# Patient Record
Sex: MALE | Race: WHITE | NOT HISPANIC OR LATINO | URBAN - METROPOLITAN AREA
[De-identification: names, ages, dates, MRNs, and addresses within clinical notes are randomized per-mention and may not be internally consistent; named-entity substitution may affect disease eponyms.]

---

## 2018-01-12 DIAGNOSIS — E78.5 HYPERLIPIDEMIA, UNSPECIFIED HYPERLIPIDEMIA TYPE: ICD-10-CM

## 2018-01-12 DIAGNOSIS — I10 ESSENTIAL HYPERTENSION: Primary | ICD-10-CM

## 2018-01-15 DIAGNOSIS — R73.09 ELEVATED GLUCOSE LEVEL: Primary | ICD-10-CM

## 2018-01-16 LAB
ALBUMIN SERPL-MCNC: 4.2 G/DL (ref 3.5–5.2)
ALBUMIN/GLOB SERPL: 1.4 G/DL
ALP SERPL-CCNC: 77 U/L (ref 39–117)
ALT SERPL-CCNC: 89 U/L (ref 1–41)
AST SERPL-CCNC: 67 U/L (ref 1–40)
BILIRUB SERPL-MCNC: 0.6 MG/DL (ref 0.1–1.2)
BUN SERPL-MCNC: 14 MG/DL (ref 6–20)
BUN/CREAT SERPL: 13 (ref 7–25)
CALCIUM SERPL-MCNC: 10 MG/DL (ref 8.6–10.5)
CHLORIDE SERPL-SCNC: 103 MMOL/L (ref 98–107)
CHOLEST SERPL-MCNC: 96 MG/DL (ref 0–200)
CO2 SERPL-SCNC: 26.7 MMOL/L (ref 22–29)
CREAT SERPL-MCNC: 1.08 MG/DL (ref 0.76–1.27)
GLOBULIN SER CALC-MCNC: 3 GM/DL
GLUCOSE SERPL-MCNC: 118 MG/DL (ref 65–99)
HBA1C MFR BLD: 6.71 % (ref 4.8–5.6)
HDLC SERPL-MCNC: 23 MG/DL (ref 40–60)
LDLC SERPL CALC-MCNC: 42 MG/DL (ref 0–100)
LDLC/HDLC SERPL: 1.82 {RATIO}
POTASSIUM SERPL-SCNC: 4.3 MMOL/L (ref 3.5–5.2)
PROT SERPL-MCNC: 7.2 G/DL (ref 6–8.5)
SODIUM SERPL-SCNC: 144 MMOL/L (ref 136–145)
TRIGL SERPL-MCNC: 156 MG/DL (ref 0–150)
VLDLC SERPL CALC-MCNC: 31.2 MG/DL (ref 5–40)

## 2018-01-19 ENCOUNTER — OFFICE VISIT (OUTPATIENT)
Dept: FAMILY MEDICINE CLINIC | Facility: CLINIC | Age: 48
End: 2018-01-19

## 2018-01-19 VITALS
HEART RATE: 85 BPM | WEIGHT: 315 LBS | SYSTOLIC BLOOD PRESSURE: 118 MMHG | BODY MASS INDEX: 44.1 KG/M2 | HEIGHT: 71 IN | DIASTOLIC BLOOD PRESSURE: 74 MMHG | TEMPERATURE: 97.9 F | OXYGEN SATURATION: 92 %

## 2018-01-19 DIAGNOSIS — R73.01 IMPAIRED FASTING GLUCOSE: ICD-10-CM

## 2018-01-19 DIAGNOSIS — K21.9 GASTROESOPHAGEAL REFLUX DISEASE, ESOPHAGITIS PRESENCE NOT SPECIFIED: ICD-10-CM

## 2018-01-19 DIAGNOSIS — L98.9 SKIN LESION: ICD-10-CM

## 2018-01-19 DIAGNOSIS — E66.01 MORBID OBESITY DUE TO EXCESS CALORIES (HCC): ICD-10-CM

## 2018-01-19 DIAGNOSIS — I10 ESSENTIAL HYPERTENSION: Primary | ICD-10-CM

## 2018-01-19 DIAGNOSIS — E78.2 MIXED HYPERLIPIDEMIA: ICD-10-CM

## 2018-01-19 PROCEDURE — 99214 OFFICE O/P EST MOD 30 MIN: CPT | Performed by: FAMILY MEDICINE

## 2018-01-19 RX ORDER — SIMVASTATIN 40 MG
40 TABLET ORAL NIGHTLY
Qty: 90 TABLET | Refills: 1 | Status: SHIPPED | OUTPATIENT
Start: 2018-01-19 | End: 2018-09-04 | Stop reason: SDUPTHER

## 2018-01-19 RX ORDER — RISPERIDONE 4 MG/1
4 TABLET ORAL
Refills: 3 | COMMUNITY
Start: 2017-11-16 | End: 2020-10-27 | Stop reason: SDUPTHER

## 2018-01-19 RX ORDER — VALSARTAN AND HYDROCHLOROTHIAZIDE 160; 25 MG/1; MG/1
TABLET ORAL
COMMUNITY
Start: 2017-05-09 | End: 2018-12-20

## 2018-01-19 RX ORDER — ESOMEPRAZOLE MAGNESIUM 40 MG/1
40 CAPSULE, DELAYED RELEASE ORAL
Qty: 30 CAPSULE | Refills: 5 | Status: SHIPPED | OUTPATIENT
Start: 2018-01-19 | End: 2018-10-06 | Stop reason: SDUPTHER

## 2018-01-19 RX ORDER — BISOPROLOL FUMARATE AND HYDROCHLOROTHIAZIDE 10; 6.25 MG/1; MG/1
1 TABLET ORAL DAILY
Qty: 90 TABLET | Refills: 1 | Status: SHIPPED | OUTPATIENT
Start: 2018-01-19 | End: 2018-09-04 | Stop reason: SDUPTHER

## 2018-01-19 RX ORDER — ESOMEPRAZOLE MAGNESIUM 40 MG/1
CAPSULE, DELAYED RELEASE ORAL
COMMUNITY
Start: 2018-01-09 | End: 2018-01-19 | Stop reason: SDUPTHER

## 2018-01-19 NOTE — PROGRESS NOTES
"Subjective   Kade Pereira is a 47 y.o. male.     Annual Exam (follow up labs, needs a dermatology referral )    History of Present Illness    Hypertension follow up. Doing well with current medication which he is taking as directed. No known high or low blood pressure episodes. No cardiovascular or neurological symptoms. Today's BP: 118/74.  I have not seen the patient a couple of years because of insurance issues.    Hyperlipidemia follow up. He is taking statin medication without complaint.  He is also taking fenofibrate.  His liver enzymes are moderately elevated.  About 2 or 3 times normal.  Morbid obesity.  No alcohol use.  Nonsmoker.  He has no exercise.  Also taking fenofibrate.  No myopathy symptoms.     Last lipid panel:   Lab Results   Component Value Date    HDL 23 (L) 01/15/2018     Lab Results   Component Value Date    LDL 42 01/15/2018     Lab Results   Component Value Date    TRIG 156 (H) 01/15/2018     Impaired fasting glucose.  A1c 6.2 at the [facility] system one year ago.  Now 6.7.  However his fasting blood glucose recently was 118.  However at the [facility] system year ago was about 130.    The following portions of the patient's history were reviewed and updated as appropriate: allergies, current medications, past family history, past medical history, past social history, past surgical history and problem list.      Review of Systems   Constitutional: Negative.    Respiratory: Negative.    Cardiovascular: Negative.    Musculoskeletal: Positive for back pain.   Neurological: Negative.    Psychiatric/Behavioral: Negative.        Objective   Blood pressure 118/74, pulse 85, temperature 97.9 °F (36.6 °C), temperature source Oral, height 180.3 cm (70.98\"), weight (!) 175 kg (385 lb 8 oz), SpO2 92 %.  Physical Exam   Constitutional: He appears well-developed and well-nourished. No distress.   Neck: No thyromegaly present.   Cardiovascular: Normal rate, regular rhythm, normal heart sounds and intact " distal pulses.    Pulmonary/Chest: Effort normal and breath sounds normal.   Abdominal: Soft. Bowel sounds are normal. He exhibits no distension. There is no tenderness.   Morbidly obese   Musculoskeletal: He exhibits no edema.   Skin: Skin is warm and dry.   Psychiatric: He has a normal mood and affect. His behavior is normal. Judgment and thought content normal.   Nursing note and vitals reviewed.      Assessment/Plan   Kade was seen today for annual exam.    Diagnoses and all orders for this visit:    Essential hypertension    Mixed hyperlipidemia  -     Comprehensive Metabolic Panel; Future  -     Lipid Panel; Future    Impaired fasting glucose  -     Hemoglobin A1c; Future    Morbid obesity due to excess calories    Gastroesophageal reflux disease, esophagitis presence not specified    Skin lesion  -     Ambulatory Referral to Dermatology    Other orders  -     esomeprazole (nexIUM) 40 MG capsule; Take 1 capsule by mouth Every Morning Before Breakfast.  -     bisoprolol-hydrochlorothiazide (ZIAC) 10-6.25 MG per tablet; Take 1 tablet by mouth Daily. TK 1 T PO QD  -     simvastatin (ZOCOR) 40 MG tablet; Take 1 tablet by mouth Every Night. TK 1 T PO QHS  -     metFORMIN (GLUCOPHAGE) 500 MG tablet; Take 1 tablet by mouth Daily With Breakfast.      Impaired fasting glucose.  Probable developing diabetes.  I am starting metformin 500 mg once a day side effects discussed including GI upset.  I'll see her back within 3 months for recheck and wellness visit.  We discussed portion size.  We discussed low carbohydrates.  We discussed increase exercise in detail.    Hyperlipidemia.  He continues on simvastatin and fenofibrate.  His liver enzymes are minimally elevated likely related to nonalcoholic fatty liver syndrome.  At this point I am not stopping the cholesterol medication.    Hypertension.  Stable.  I refilled his bisoprolol hydrochlorothiazide.  Next    GERD.  Long-standing.  Continues Nexium.    Reported  schizoaffective disorder.  Also followed by psychiatry.

## 2018-01-24 ENCOUNTER — RESULTS ENCOUNTER (OUTPATIENT)
Dept: FAMILY MEDICINE CLINIC | Facility: CLINIC | Age: 48
End: 2018-01-24

## 2018-01-24 DIAGNOSIS — E78.2 MIXED HYPERLIPIDEMIA: ICD-10-CM

## 2018-01-24 DIAGNOSIS — R73.01 IMPAIRED FASTING GLUCOSE: ICD-10-CM

## 2018-09-04 RX ORDER — BISOPROLOL FUMARATE AND HYDROCHLOROTHIAZIDE 10; 6.25 MG/1; MG/1
TABLET ORAL
Qty: 90 TABLET | Refills: 1 | Status: SHIPPED | OUTPATIENT
Start: 2018-09-04 | End: 2019-03-31 | Stop reason: SDUPTHER

## 2018-09-04 RX ORDER — SIMVASTATIN 40 MG
TABLET ORAL
Qty: 90 TABLET | Refills: 1 | Status: SHIPPED | OUTPATIENT
Start: 2018-09-04 | End: 2019-03-30 | Stop reason: SDUPTHER

## 2018-10-08 RX ORDER — ESOMEPRAZOLE MAGNESIUM 40 MG/1
40 CAPSULE, DELAYED RELEASE ORAL
Qty: 30 CAPSULE | Refills: 5 | Status: SHIPPED | OUTPATIENT
Start: 2018-10-08 | End: 2019-03-31 | Stop reason: SDUPTHER

## 2018-12-20 DIAGNOSIS — E78.2 MIXED HYPERLIPIDEMIA: Primary | ICD-10-CM

## 2018-12-20 DIAGNOSIS — R73.01 IMPAIRED FASTING GLUCOSE: ICD-10-CM

## 2018-12-20 DIAGNOSIS — I10 ESSENTIAL HYPERTENSION: ICD-10-CM

## 2018-12-20 RX ORDER — LOSARTAN POTASSIUM AND HYDROCHLOROTHIAZIDE 25; 100 MG/1; MG/1
1 TABLET ORAL DAILY
Qty: 90 TABLET | Refills: 1 | OUTPATIENT
Start: 2018-12-20

## 2018-12-20 RX ORDER — LOSARTAN POTASSIUM AND HYDROCHLOROTHIAZIDE 25; 100 MG/1; MG/1
1 TABLET ORAL DAILY
Qty: 30 TABLET | Refills: 1 | Status: SHIPPED | OUTPATIENT
Start: 2018-12-20 | End: 2019-04-16 | Stop reason: SDUPTHER

## 2018-12-20 NOTE — TELEPHONE ENCOUNTER
He is overdue for a doctor visit.  I'm only giving him a 30 day supply.  He needs a CMP, lipid panel, A1c ordered

## 2019-04-01 RX ORDER — ESOMEPRAZOLE MAGNESIUM 40 MG/1
40 CAPSULE, DELAYED RELEASE ORAL
Qty: 30 CAPSULE | Refills: 4 | Status: SHIPPED | OUTPATIENT
Start: 2019-04-01 | End: 2019-08-18 | Stop reason: SDUPTHER

## 2019-04-01 RX ORDER — BISOPROLOL FUMARATE AND HYDROCHLOROTHIAZIDE 10; 6.25 MG/1; MG/1
TABLET ORAL
Qty: 90 TABLET | Refills: 0 | Status: SHIPPED | OUTPATIENT
Start: 2019-04-01 | End: 2019-05-24 | Stop reason: SDUPTHER

## 2019-04-01 RX ORDER — SIMVASTATIN 40 MG
TABLET ORAL
Qty: 90 TABLET | Refills: 0 | Status: SHIPPED | OUTPATIENT
Start: 2019-04-01 | End: 2019-05-24 | Stop reason: SDUPTHER

## 2019-04-15 NOTE — TELEPHONE ENCOUNTER
Pt called wanting refills for bp meds he has cancelled appointments several times so I transferred him up front to make an apt for this week or next week

## 2019-04-16 ENCOUNTER — OFFICE VISIT (OUTPATIENT)
Dept: FAMILY MEDICINE CLINIC | Facility: CLINIC | Age: 49
End: 2019-04-16

## 2019-04-16 VITALS
DIASTOLIC BLOOD PRESSURE: 82 MMHG | BODY MASS INDEX: 44.1 KG/M2 | SYSTOLIC BLOOD PRESSURE: 139 MMHG | HEART RATE: 82 BPM | TEMPERATURE: 96.7 F | HEIGHT: 71 IN | OXYGEN SATURATION: 93 % | WEIGHT: 315 LBS

## 2019-04-16 DIAGNOSIS — C61 PROSTATE CANCER, RECUR RISK NOT DETERMINED WHETHER LOW, MED OR HIGH (HCC): ICD-10-CM

## 2019-04-16 DIAGNOSIS — E66.01 MORBID OBESITY DUE TO EXCESS CALORIES (HCC): ICD-10-CM

## 2019-04-16 DIAGNOSIS — F25.9 SCHIZOAFFECTIVE DISORDER, UNSPECIFIED TYPE (HCC): ICD-10-CM

## 2019-04-16 DIAGNOSIS — I10 ESSENTIAL HYPERTENSION: ICD-10-CM

## 2019-04-16 DIAGNOSIS — Z12.5 SCREENING FOR PROSTATE CANCER: ICD-10-CM

## 2019-04-16 DIAGNOSIS — Z12.5 SCREENING PSA (PROSTATE SPECIFIC ANTIGEN): ICD-10-CM

## 2019-04-16 DIAGNOSIS — E78.2 MIXED HYPERLIPIDEMIA: ICD-10-CM

## 2019-04-16 DIAGNOSIS — R73.01 IMPAIRED FASTING GLUCOSE: ICD-10-CM

## 2019-04-16 DIAGNOSIS — Z00.00 MEDICARE ANNUAL WELLNESS VISIT, INITIAL: Primary | ICD-10-CM

## 2019-04-16 DIAGNOSIS — S06.9X9S TRAUMATIC BRAIN INJURY WITH LOSS OF CONSCIOUSNESS, SEQUELA (HCC): ICD-10-CM

## 2019-04-16 PROCEDURE — 99214 OFFICE O/P EST MOD 30 MIN: CPT | Performed by: FAMILY MEDICINE

## 2019-04-16 PROCEDURE — 96160 PT-FOCUSED HLTH RISK ASSMT: CPT | Performed by: FAMILY MEDICINE

## 2019-04-16 PROCEDURE — G0438 PPPS, INITIAL VISIT: HCPCS | Performed by: FAMILY MEDICINE

## 2019-04-16 RX ORDER — LOSARTAN POTASSIUM AND HYDROCHLOROTHIAZIDE 25; 100 MG/1; MG/1
1 TABLET ORAL DAILY
Qty: 90 TABLET | Refills: 1 | Status: SHIPPED | OUTPATIENT
Start: 2019-04-16 | End: 2019-05-24 | Stop reason: SDUPTHER

## 2019-04-16 RX ORDER — FLUOXETINE HYDROCHLORIDE 20 MG/1
CAPSULE ORAL
Refills: 1 | COMMUNITY
Start: 2019-04-10

## 2019-04-16 NOTE — PROGRESS NOTES
Initial Medicare Wellness Visit   The ABC's of the Annual Wellness Visit    Chief Complaint   Patient presents with   • Hypertension   • Diarrhea     due to the metformin would like something else   • Skin Problem     there is a spot on his belly that feels different   • Leg Swelling     bilateral his legs will sometimes leak fluid       HPI:  Kade Pereira, -1970, is a 48 y.o. male who presents for an Initial Medicare Wellness Visit.    Recent Hospitalizations:  No hospitalization(s) within the last year..    Current Medical Providers:  Patient Care Team:  Basil Covington MD as PCP - General (Family Medicine)    Health Habits and Functional and Cognitive Screening and Depression Screening:  Functional & Cognitive Status 2019   Do you have difficulty preparing food and eating? No   Do you have difficulty bathing yourself, getting dressed or grooming yourself? Yes   Do you have difficulty using the toilet? Yes   Do you have difficulty moving around from place to place? No   Do you have trouble with steps or getting out of a bed or a chair? No   In the past year have you fallen or experienced a near fall? Yes   Current Diet Unhealthy Diet   Dental Exam Not up to date   Eye Exam Not up to date   Exercise (times per week) 0 times per week   Current Exercise Activities Include None   Do you need help using the phone?  No   Are you deaf or do you have serious difficulty hearing?  No   Do you need help with transportation? No   Do you need help shopping? Yes   Do you need help preparing meals?  No   Do you need help with housework?  Yes   Do you need help with laundry? Yes   Do you need help taking your medications? No   Do you need help managing money? No   Do you ever drive or ride in a car without wearing a seat belt? Yes   Have you felt unusual stress, anger or loneliness in the last month? Yes   Who do you live with? Spouse   If you need help, do you have trouble finding someone available to you? No    Have you been bothered in the last four weeks by sexual problems? Yes   Do you have difficulty concentrating, remembering or making decisions? Yes       Compared to one year ago, the patient feels his physical health is the same and his mental health is the same.    Depression Screen:  PHQ-2/PHQ-9 Depression Screening 4/16/2019   Little interest or pleasure in doing things 3   Feeling down, depressed, or hopeless 1   Trouble falling or staying asleep, or sleeping too much 3   Feeling tired or having little energy 3   Poor appetite or overeating 0   Feeling bad about yourself - or that you are a failure or have let yourself or your family down 1   Trouble concentrating on things, such as reading the newspaper or watching television 3   Moving or speaking so slowly that other people could have noticed. Or the opposite - being so fidgety or restless that you have been moving around a lot more than usual 0   Thoughts that you would be better off dead, or of hurting yourself in some way 0   Total Score 14   If you checked off any problems, how difficult have these problems made it for you to do your work, take care of things at home, or get along with other people? Very difficult         Past Medical/Family/Social History:  The following portions of the patient's history were reviewed and updated as appropriate: allergies, current medications, past family history, past medical history, past social history, past surgical history and problem list.    Allergies   Allergen Reactions   • Ace Inhibitors    • Lorabid [Loracarbef]      rash         Current Outpatient Medications:   •  bisoprolol-hydrochlorothiazide (ZIAC) 10-6.25 MG per tablet, TAKE 1 TABLET BY MOUTH EVERY DAY, Disp: 90 tablet, Rfl: 0  •  esomeprazole (nexIUM) 40 MG capsule, TAKE 1 CAPSULE BY MOUTH EVERY MORNING BEFORE BREAKFAST., Disp: 30 capsule, Rfl: 4  •  fenofibrate 160 MG tablet, TAKE 1 TABLET BY MOUTH EVERY DAY, Disp: , Rfl: 0  •  FLUoxetine (PROzac) 20  "MG capsule, TK ONE C PO  QAM, Disp: , Rfl: 1  •  losartan-hydrochlorothiazide (HYZAAR) 100-25 MG per tablet, Take 1 tablet by mouth Daily., Disp: 90 tablet, Rfl: 1  •  risperiDONE (risperDAL) 4 MG tablet, 4 mg. Take 1/2 by mouth every morning and 1 1/2 tablet every evening, Disp: , Rfl: 3  •  simvastatin (ZOCOR) 40 MG tablet, TAKE 1 TABLET BY MOUTH AT BEDTIME, Disp: 90 tablet, Rfl: 0    Aspirin use counseling: Does not need ASA (and currently is not on it)    Current medication list contains high risk medications. Some potential harmful drug interactions identified. Plan of action: continue psych meds per pschiatry    Family History   Problem Relation Age of Onset   • Thyroid disease Mother    • Hypertension Mother    • Hypertension Father        Social History     Tobacco Use   • Smoking status: Never Smoker   • Smokeless tobacco: Never Used   Substance Use Topics   • Alcohol use: Yes     Comment: occasional       Past Surgical History:   Procedure Laterality Date   • COLONOSCOPY     • HAND SURGERY     • WISDOM TOOTH EXTRACTION         Patient Active Problem List   Diagnosis   • Mixed hyperlipidemia   • Essential hypertension   • Schizoaffective disorder (CMS/HCC)   • Traumatic brain injury (CMS/HCC)   • Morbid obesity due to excess calories (CMS/HCC)   • Venous stasis dermatitis of both lower extremities   • Gastroesophageal reflux disease   • Obstructive sleep apnea   • Impaired fasting glucose       Review of Systems    Objective     Vitals:    04/16/19 1506   BP: 139/82   Pulse: 82   Temp: 96.7 °F (35.9 °C)   TempSrc: Oral   SpO2: 93%   Weight: (!) 183 kg (403 lb)   Height: 180.3 cm (70.98\")       Patient's Body mass index is 56.23 kg/m². BMI is above normal parameters. Recommendations include: exercise counseling.      No exam data present    The patient has no evidence of cognitve impairment.     Physical Exam    Recent Lab Results:  Lab Results   Component Value Date     (H) 01/15/2018     Lab Results "   Component Value Date    TRIG 156 (H) 01/15/2018    HDL 23 (L) 01/15/2018    VLDL 31.2 01/15/2018    LDLHDL 1.82 01/15/2018         Assessment/Plan   Age-appropriate Screening Schedule:  Refer to the list below for future screening recommendations based on patient's age, sex and/or medical conditions.      Health Maintenance   Topic Date Due   • TDAP/TD VACCINES (1 - Tdap) 08/02/1990   • LIPID PANEL  01/15/2019   • INFLUENZA VACCINE  08/01/2019       Medicare Risks and Personalized Health Plan:  Obesity/Overweight condition;  Exercise recommended. And weight watchers.     CMS-Preventive Services Quick Reference  Medicare Preventive Services Addressed:  Annual Wellness Visit (AWV)   Recommend TdaP at pharmacy.   Prostate cancer screening discussed.    Advance Care Planning:  Patient does not have an advance directive - information provided to the patient today    Diagnoses and all orders for this visit:    1. Medicare annual wellness visit, initial (Primary)    2. Impaired fasting glucose  -     Hemoglobin A1c    3. Essential hypertension    4. Mixed hyperlipidemia  -     Comprehensive Metabolic Panel  -     Lipid Panel    5. Schizoaffective disorder, unspecified type (CMS/[facility])    6. Traumatic brain injury with loss of consciousness, sequela (CMS/[facility])    7. Morbid obesity due to excess calories (CMS/[facility])    8. Screening PSA (prostate specific antigen)    9. Screening for prostate cancer    10. Prostate cancer, recur risk not determined whether low, med or high (CMS/[facility])  -     PSA Screen    Other orders  -     losartan-hydrochlorothiazide (HYZAAR) 100-25 MG per tablet; Take 1 tablet by mouth Daily.  Dispense: 90 tablet; Refill: 1        An After Visit Summary and PPPS with all of these plans were given to the patient.      Follow Up:  Return in about 3 months (around 7/16/2019) for Subsequent Medicare Wellness Visit.

## 2019-04-16 NOTE — PROGRESS NOTES
"Subjective   Kade Pereira is a 48 y.o. male.     Chief Complaint   Patient presents with   • Hypertension   • Diarrhea     due to the metformin would like something else   • Skin Problem     there is a spot on his belly that feels different   • Leg Swelling     bilateral his legs will sometimes leak fluid        History of Present Illness    Impaired fasting blood work.  His A1c was in the mid 6% range.  We started metformin last visit.  He stopped it because of diarrhea.  He has borderline criteria for diabetes type 2.  Severe morbid obesity.  He has trouble exercising because of his weight.    Hypertension.  He continues losartan hydrochlorothiazide at maximum dose.  He has some dependent edema that bothers him at times.  Exacerbated by his morbid obesity.    Traumatic brain injury some years ago.  He is on disability.  Also schizoaffective disorder.  He has some anxiety in the evening with some paranoia.  He is followed by a psychiatrist.  He continues Risperdal and and fluoxetine.    Mixed hyperlipidemia.  He continues his fenofibrate and simvastatin.  He has not gotten blood work done the last 2 times I have ordered it.      The following portions of the patient's history were reviewed and updated as appropriate: allergies, current medications, past family history, past medical history, past social history, past surgical history and problem list.          Review of Systems   Constitutional: Negative.    Respiratory: Negative.    Cardiovascular: Positive for leg swelling.   Musculoskeletal: Negative.    Psychiatric/Behavioral: Positive for dysphoric mood.       Objective   Blood pressure 139/82, pulse 82, temperature 96.7 °F (35.9 °C), temperature source Oral, height 180.3 cm (70.98\"), weight (!) 183 kg (403 lb), SpO2 93 %.  Physical Exam   Constitutional: He appears well-developed and well-nourished. No distress.   Neck: No thyromegaly present.   Cardiovascular: Normal rate, regular rhythm, normal heart " sounds and intact distal pulses.   Pulmonary/Chest: Effort normal and breath sounds normal.   Abdominal: Soft.   Obese panniculus.  He describes some fullness sensation in the left lower abdomen.  He thinks he has a fat roll that is different in his words.  On palpation unremarkable.  No lipoma.  Nontender.  No mass.   Musculoskeletal: He exhibits edema.   Patient has moderately dense lower extremity edema with chronic venous stasis.  No erythema.   Skin: Skin is warm and dry.   Psychiatric: Judgment and thought content normal.   Nursing note and vitals reviewed.      Assessment/Plan   Kade was seen today for hypertension, diarrhea, skin problem and leg swelling.    Diagnoses and all orders for this visit:    Medicare annual wellness visit, initial    Impaired fasting glucose  -     Hemoglobin A1c    Essential hypertension    Mixed hyperlipidemia  -     Comprehensive Metabolic Panel  -     Lipid Panel    Schizoaffective disorder, unspecified type (CMS/HCC)    Traumatic brain injury with loss of consciousness, sequela (CMS/HCC)    Morbid obesity due to excess calories (CMS/HCC)    Screening PSA (prostate specific antigen)  -     PSA Screen    Other orders  -     losartan-hydrochlorothiazide (HYZAAR) 100-25 MG per tablet; Take 1 tablet by mouth Daily.      Impaired fasting glucose.  Rechecking A1c.  He has not tolerating metformin.  He can stop it.    Hypertension.  Continue losartan hydrochlorothiazide.  He has tense dependent edema related to his obesity.  Exercise counseling given.    Next hyperlipidemia.  Check CMP and lipid panel.  Continue fenofibrate and the statin.    Schizoaffective disorder, chronic brain injury, followed by psychiatry.    Prostate cancer screening.  PSA ordered.

## 2019-04-17 DIAGNOSIS — R73.01 IMPAIRED FASTING GLUCOSE: ICD-10-CM

## 2019-04-17 DIAGNOSIS — I10 ESSENTIAL HYPERTENSION: ICD-10-CM

## 2019-04-17 DIAGNOSIS — E78.2 MIXED HYPERLIPIDEMIA: Primary | ICD-10-CM

## 2019-04-17 DIAGNOSIS — E11.9 TYPE 2 DIABETES MELLITUS WITHOUT COMPLICATION, WITHOUT LONG-TERM CURRENT USE OF INSULIN (HCC): ICD-10-CM

## 2019-04-17 LAB
ALBUMIN SERPL-MCNC: 4.3 G/DL (ref 3.5–5.2)
ALBUMIN/GLOB SERPL: 1.7 G/DL
ALP SERPL-CCNC: 89 U/L (ref 39–117)
ALT SERPL-CCNC: 82 U/L (ref 1–41)
AST SERPL-CCNC: 54 U/L (ref 1–40)
BILIRUB SERPL-MCNC: 0.6 MG/DL (ref 0.2–1.2)
BUN SERPL-MCNC: 12 MG/DL (ref 6–20)
BUN/CREAT SERPL: 12.5 (ref 7–25)
CALCIUM SERPL-MCNC: 9.7 MG/DL (ref 8.6–10.5)
CHLORIDE SERPL-SCNC: 103 MMOL/L (ref 98–107)
CHOLEST SERPL-MCNC: 129 MG/DL (ref 0–200)
CO2 SERPL-SCNC: 26.8 MMOL/L (ref 22–29)
CREAT SERPL-MCNC: 0.96 MG/DL (ref 0.76–1.27)
GLOBULIN SER CALC-MCNC: 2.6 GM/DL
GLUCOSE SERPL-MCNC: 122 MG/DL (ref 65–99)
HBA1C MFR BLD: 8.2 % (ref 4.8–5.6)
HDLC SERPL-MCNC: 29 MG/DL (ref 40–60)
LDLC SERPL CALC-MCNC: 66 MG/DL (ref 0–100)
POTASSIUM SERPL-SCNC: 3.9 MMOL/L (ref 3.5–5.2)
PROT SERPL-MCNC: 6.9 G/DL (ref 6–8.5)
PSA SERPL-MCNC: 0.19 NG/ML (ref 0–4)
SODIUM SERPL-SCNC: 142 MMOL/L (ref 136–145)
TRIGL SERPL-MCNC: 168 MG/DL (ref 0–150)
VLDLC SERPL CALC-MCNC: 33.6 MG/DL

## 2019-04-17 RX ORDER — GLIMEPIRIDE 2 MG/1
2 TABLET ORAL
Qty: 90 TABLET | Refills: 1 | Status: SHIPPED | OUTPATIENT
Start: 2019-04-17 | End: 2019-05-24 | Stop reason: SDUPTHER

## 2019-04-17 NOTE — PROGRESS NOTES
The blood sugars are now officially in the diabetes range.  The A1c is high.  I recommend starting metformin 500 mg twice a day ... #180 with 1 refill.  We will sent to pharmacy.  Need 3-month follow-up appointment.  Also needs referral to diabetic education.  Needs A1c and CMP prior to next visit.

## 2019-05-24 RX ORDER — LOSARTAN POTASSIUM AND HYDROCHLOROTHIAZIDE 25; 100 MG/1; MG/1
1 TABLET ORAL DAILY
Qty: 90 TABLET | Refills: 0 | Status: SHIPPED | OUTPATIENT
Start: 2019-05-24 | End: 2019-10-16

## 2019-05-24 RX ORDER — SIMVASTATIN 40 MG
40 TABLET ORAL
Qty: 90 TABLET | Refills: 0 | Status: SHIPPED | OUTPATIENT
Start: 2019-05-24 | End: 2019-07-08

## 2019-05-24 RX ORDER — BISOPROLOL FUMARATE AND HYDROCHLOROTHIAZIDE 10; 6.25 MG/1; MG/1
1 TABLET ORAL DAILY
Qty: 90 TABLET | Refills: 0 | Status: SHIPPED | OUTPATIENT
Start: 2019-05-24 | End: 2019-11-15 | Stop reason: SDUPTHER

## 2019-05-24 RX ORDER — GLIMEPIRIDE 2 MG/1
2 TABLET ORAL
Qty: 90 TABLET | Refills: 0 | Status: SHIPPED | OUTPATIENT
Start: 2019-05-24 | End: 2020-04-06

## 2019-07-08 RX ORDER — SIMVASTATIN 40 MG
TABLET ORAL
Qty: 90 TABLET | Refills: 1 | Status: SHIPPED | OUTPATIENT
Start: 2019-07-08 | End: 2020-07-02

## 2019-07-15 ENCOUNTER — RESULTS ENCOUNTER (OUTPATIENT)
Dept: FAMILY MEDICINE CLINIC | Facility: CLINIC | Age: 49
End: 2019-07-15

## 2019-07-15 DIAGNOSIS — E11.9 TYPE 2 DIABETES MELLITUS WITHOUT COMPLICATION, WITHOUT LONG-TERM CURRENT USE OF INSULIN (HCC): ICD-10-CM

## 2019-07-15 DIAGNOSIS — E78.2 MIXED HYPERLIPIDEMIA: ICD-10-CM

## 2019-07-15 DIAGNOSIS — I10 ESSENTIAL HYPERTENSION: ICD-10-CM

## 2019-08-19 RX ORDER — ESOMEPRAZOLE MAGNESIUM 40 MG/1
40 CAPSULE, DELAYED RELEASE ORAL
Qty: 90 CAPSULE | Refills: 1 | Status: SHIPPED | OUTPATIENT
Start: 2019-08-19 | End: 2020-01-13

## 2019-10-16 RX ORDER — HYDROCHLOROTHIAZIDE 25 MG/1
25 TABLET ORAL DAILY
Qty: 90 TABLET | Refills: 1 | Status: SHIPPED | OUTPATIENT
Start: 2019-10-16 | End: 2019-11-20

## 2019-10-16 RX ORDER — LOSARTAN POTASSIUM 100 MG/1
100 TABLET ORAL DAILY
Qty: 90 TABLET | Refills: 1 | Status: SHIPPED | OUTPATIENT
Start: 2019-10-16 | End: 2019-11-20

## 2019-11-14 DIAGNOSIS — E11.9 TYPE 2 DIABETES MELLITUS WITHOUT COMPLICATION, WITHOUT LONG-TERM CURRENT USE OF INSULIN (HCC): Primary | ICD-10-CM

## 2019-11-14 DIAGNOSIS — E78.2 MIXED HYPERLIPIDEMIA: ICD-10-CM

## 2019-11-14 DIAGNOSIS — I10 ESSENTIAL HYPERTENSION: ICD-10-CM

## 2019-11-14 RX ORDER — BISOPROLOL FUMARATE AND HYDROCHLOROTHIAZIDE 10; 6.25 MG/1; MG/1
1 TABLET ORAL DAILY
Qty: 90 TABLET | Refills: 1 | OUTPATIENT
Start: 2019-11-14

## 2019-11-14 NOTE — TELEPHONE ENCOUNTER
Patient needs appointment for further refills.  Needs diabetes follow-up with A1c, CMP, lipid panel prior.  Please let patient know we can send in a bridge prescription as soon as follow-up appointment is made.

## 2019-11-14 NOTE — TELEPHONE ENCOUNTER
Please call pt needs a follow up and labs before we can refill any medication. Please let me know when this is done

## 2019-11-15 RX ORDER — BISOPROLOL FUMARATE AND HYDROCHLOROTHIAZIDE 10; 6.25 MG/1; MG/1
1 TABLET ORAL DAILY
Qty: 90 TABLET | Refills: 0 | Status: SHIPPED | OUTPATIENT
Start: 2019-11-15 | End: 2019-12-10 | Stop reason: SDUPTHER

## 2019-11-20 ENCOUNTER — OFFICE VISIT (OUTPATIENT)
Dept: FAMILY MEDICINE CLINIC | Facility: CLINIC | Age: 49
End: 2019-11-20

## 2019-11-20 VITALS
RESPIRATION RATE: 16 BRPM | TEMPERATURE: 98 F | WEIGHT: 315 LBS | BODY MASS INDEX: 44.1 KG/M2 | SYSTOLIC BLOOD PRESSURE: 151 MMHG | HEART RATE: 110 BPM | OXYGEN SATURATION: 93 % | DIASTOLIC BLOOD PRESSURE: 94 MMHG | HEIGHT: 71 IN

## 2019-11-20 DIAGNOSIS — E11.9 TYPE 2 DIABETES MELLITUS WITHOUT COMPLICATION, WITHOUT LONG-TERM CURRENT USE OF INSULIN (HCC): Primary | ICD-10-CM

## 2019-11-20 DIAGNOSIS — E66.01 MORBID OBESITY DUE TO EXCESS CALORIES (HCC): ICD-10-CM

## 2019-11-20 DIAGNOSIS — F40.00 AGORAPHOBIA: ICD-10-CM

## 2019-11-20 DIAGNOSIS — E78.2 MIXED HYPERLIPIDEMIA: ICD-10-CM

## 2019-11-20 DIAGNOSIS — I10 ESSENTIAL HYPERTENSION: ICD-10-CM

## 2019-11-20 DIAGNOSIS — I87.2 VENOUS STASIS DERMATITIS OF LEFT LOWER EXTREMITY: ICD-10-CM

## 2019-11-20 DIAGNOSIS — F25.9 SCHIZOAFFECTIVE DISORDER, UNSPECIFIED TYPE (HCC): ICD-10-CM

## 2019-11-20 PROCEDURE — 99214 OFFICE O/P EST MOD 30 MIN: CPT | Performed by: FAMILY MEDICINE

## 2019-11-20 RX ORDER — FUROSEMIDE 20 MG/1
20 TABLET ORAL DAILY PRN
Qty: 30 TABLET | Refills: 2 | Status: SHIPPED | OUTPATIENT
Start: 2019-11-20 | End: 2020-06-23

## 2019-11-20 RX ORDER — METFORMIN HYDROCHLORIDE 500 MG/1
500 TABLET, EXTENDED RELEASE ORAL 2 TIMES DAILY
Qty: 60 TABLET | Refills: 5 | Status: SHIPPED | OUTPATIENT
Start: 2019-11-20 | End: 2020-06-23

## 2019-11-20 NOTE — PROGRESS NOTES
"Subjective   Kade Pereira is a 49 y.o. male.     Chief Complaint   Patient presents with   • Diabetes     f/u diabetes    • Foot Swelling     left foot swelling off and on for a couple years, has worsened over the last several months         History of Present Illness    49-year-old male with traumatic brain injury and depression presents for follow-up.  I have not seen him in a number of months.  Diabetes type 2.  He states the metformin was not tolerable because of the diarrhea.  He is taking glimepiride.  He continues to gain weight.  He did not go to diabetic education.  A1c unknown.  It was 8 about 8 months ago.    Care is complicated by morbid obesity.  His weight is now over 400 pounds.  He is complaining of some swelling in the left lower extremity and left foot.  Some redness.  No fever.  No calf pain or knee pain.    He follows with a psychiatrist.  He states the Risperdal makes him eat.  He continues fluoxetine.  He suffers from [place] phobia.  He is afraid to go outside the house.  Keeps him from getting to the doctor.    Hypertension.  Continues bisoprolol hydrochlorothiazide.    Mixed hyperlipidemia.  Continue simvastatin and fenofibrate.  No recent blood work.      The following portions of the patient's history were reviewed and updated as appropriate: allergies, current medications, past family history, past medical history, past social history, past surgical history and problem list.          Review of Systems   Constitutional: Negative.    Respiratory: Negative.    Cardiovascular: Positive for leg swelling.   Neurological: Negative.    Psychiatric/Behavioral: Positive for dysphoric mood.       Objective   Blood pressure 151/94, pulse 110, temperature 98 °F (36.7 °C), resp. rate 16, height 180.3 cm (70.98\"), weight (!) 191 kg (422 lb), SpO2 93 %.  Physical Exam   Constitutional:   No acute distress.  Morbidly obese.   HENT:   Head: Atraumatic.   Neck: Normal range of motion. Neck supple. "   Cardiovascular: Normal rate and regular rhythm.   Pulmonary/Chest: Effort normal and breath sounds normal.   Musculoskeletal:   Left lower extremity reveals venous stasis changes with anterior erythema skin induration no weeping.  The foot is slightly swollen and erythematous.  Not hot to the touch.  Calf is nontender.  No popliteal fossa tenderness.  Likely no deep venous thrombosis.  Likely no cellulitis.   Psychiatric:   Affect is flat.  Mood is depressed.       Assessment/Plan   Kade was seen today for diabetes and foot swelling.    Diagnoses and all orders for this visit:    Type 2 diabetes mellitus without complication, without long-term current use of insulin (CMS/[facility])  -     Ambulatory Referral to Diabetic Education  -     Ambulatory Referral to Bariatric Surgery    Mixed hyperlipidemia    Essential hypertension    Morbid obesity due to excess calories (CMS/[facility])  -     Ambulatory Referral to Diabetic Education  -     Ambulatory Referral to Bariatric Surgery    Venous stasis dermatitis of left lower extremity    Agoraphobia    Other orders  -     furosemide (LASIX) 20 MG tablet; Take 1 tablet by mouth Daily As Needed (edema).  -     metFORMIN ER (GLUCOPHAGE-XR) 500 MG 24 hr tablet; Take 1 tablet by mouth 2 (Two) Times a Day.      Diabetes type 2 with no immediate complication.  Needs to get to an eye doctor.  He has not gone to diabetic education.  Starting that.  He had some diarrhea with metformin instant release.  Switching to metformin extended release 500 mg twice a day continue glimepiride.  To consider adding Victoza soon.  At this time I do not see an absolute indication for endocrinology referral.  Its more an issue of compliance getting to the doctor.    Hyperlipidemia.  Mixed.  Continue TriCor and simvastatin.  Checking labs today including LFTs.    Hypertension.    Venous stasis.  Hypertension.  Adding Lasix 20 mg a day as needed.  Allergic to ACE inhibitors.  To consider ARB's.  We will  discuss in the future.    Major depression.  Schizoaffective disorder.  [place] phobia.  Complicating treatment.    Morbid obesity.  Patient is interested in bariatric consultation.  Surgery referral made.    Follow-up in 3 months.  Patient understands importance of any blood work prior to the appointment.

## 2019-11-21 LAB
ALBUMIN SERPL-MCNC: 4 G/DL (ref 3.5–5.2)
ALBUMIN/GLOB SERPL: 1.7 G/DL
ALP SERPL-CCNC: 80 U/L (ref 39–117)
ALT SERPL-CCNC: 38 U/L (ref 1–41)
AST SERPL-CCNC: 22 U/L (ref 1–40)
BILIRUB SERPL-MCNC: 0.5 MG/DL (ref 0.2–1.2)
BUN SERPL-MCNC: 14 MG/DL (ref 6–20)
BUN/CREAT SERPL: 15.2 (ref 7–25)
CALCIUM SERPL-MCNC: 9.2 MG/DL (ref 8.6–10.5)
CHLORIDE SERPL-SCNC: 100 MMOL/L (ref 98–107)
CHOLEST SERPL-MCNC: 110 MG/DL (ref 0–200)
CO2 SERPL-SCNC: 29.4 MMOL/L (ref 22–29)
CREAT SERPL-MCNC: 0.92 MG/DL (ref 0.76–1.27)
GLOBULIN SER CALC-MCNC: 2.4 GM/DL
GLUCOSE SERPL-MCNC: 159 MG/DL (ref 65–99)
HBA1C MFR BLD: 6.9 % (ref 4.8–5.6)
HDLC SERPL-MCNC: 27 MG/DL (ref 40–60)
LDLC SERPL CALC-MCNC: 51 MG/DL (ref 0–100)
POTASSIUM SERPL-SCNC: 4.1 MMOL/L (ref 3.5–5.2)
PROT SERPL-MCNC: 6.4 G/DL (ref 6–8.5)
SODIUM SERPL-SCNC: 141 MMOL/L (ref 136–145)
TRIGL SERPL-MCNC: 158 MG/DL (ref 0–150)
VLDLC SERPL CALC-MCNC: 31.6 MG/DL

## 2019-11-21 NOTE — PROGRESS NOTES
The diabetes is improved compared to previously.  Surprisingly good news.  Keep follow-up appointment in 3 months.  Will need lab work prior to then.

## 2019-12-10 RX ORDER — BISOPROLOL FUMARATE AND HYDROCHLOROTHIAZIDE 10; 6.25 MG/1; MG/1
TABLET ORAL
Qty: 30 TABLET | Refills: 5 | Status: SHIPPED | OUTPATIENT
Start: 2019-12-10 | End: 2020-04-06

## 2020-01-07 ENCOUNTER — TELEMEDICINE (OUTPATIENT)
Dept: FAMILY MEDICINE CLINIC | Facility: TELEHEALTH | Age: 50
End: 2020-01-07

## 2020-01-07 DIAGNOSIS — R21 RASH/SKIN ERUPTION: Primary | ICD-10-CM

## 2020-01-07 PROCEDURE — VIDEOVISIT: Performed by: NURSE PRACTITIONER

## 2020-01-07 RX ORDER — TRIAMCINOLONE ACETONIDE 5 MG/G
CREAM TOPICAL 3 TIMES DAILY
Qty: 15 G | Refills: 1 | Status: SHIPPED | OUTPATIENT
Start: 2020-01-07 | End: 2020-12-08

## 2020-01-07 RX ORDER — SULFAMETHOXAZOLE AND TRIMETHOPRIM 800; 160 MG/1; MG/1
1 TABLET ORAL 2 TIMES DAILY
Qty: 14 TABLET | Refills: 0 | Status: SHIPPED | OUTPATIENT
Start: 2020-01-07 | End: 2020-01-14

## 2020-01-07 NOTE — PATIENT INSTRUCTIONS
--treating with antibiotic due to warmth at the site of the rash, even though there is no swelling or tenderness; he has Type 2 DM which makes him at greater risk for infection; discussed with Mr. Pereira and agrees with the treatment    Rash/skin eruption  -     triamcinolone (KENALOG) 0.5 % cream; Apply  topically to the appropriate area as directed 3 (Three) Times a Day.  -     sulfamethoxazole-trimethoprim (BACTRIM DS,SEPTRA DS) 800-160 MG per tablet; Take 1 tablet by mouth 2 (Two) Times a Day for 7 days.    --complete medication as prescribed  --keep area clean and dry  --may continue using Caladryl or Calamine lotion between topical steroid cream  --increase intake of clear, decaffeinated fluids  --monitor site for increasing warmth, redness, swelling, tenderness, or drainage    **If there is no improvement in rash over 3-5 days after beginning treatment, will need to follow-up with Dr. Basil Covington for further evalutions    **if he has worsening of symptoms, as mentioned above, he should follow-up with Dr. Covington, urgent care, or emergency department as soon as possible          Rash, Adult  A rash is a change in the color of your skin. A rash can also change the way your skin feels. There are many different conditions and factors that can cause a rash. Some rashes may disappear after a few days, but some may last for a few weeks. Common causes of rashes include:  · Viral infections, such as:  ? Colds.  ? Measles.  ? Hand, foot, and mouth disease.  · Bacterial infections, such as:  ? Scarlet fever.  ? Impetigo.  · Fungal infections, such as Candida.  · Allergic reactions to food, medicines, or skin care products.  Follow these instructions at home:  The goal of treatment is to stop the itching and keep the rash from spreading. Pay attention to any changes in your symptoms. Follow these instructions to help with your condition:  Medicine  Take or apply over-the-counter and prescription medicines only as told by  your health care provider. These may include:  · Corticosteroid creams to treat red or swollen skin.  · Anti-itch lotions.  · Oral allergy medicines (antihistamines).  · Oral corticosteroids for severe symptoms.    Skin care  · Apply cool compresses to the affected areas.  · Do not scratch or rub your skin.  · Avoid covering the rash. Make sure the rash is exposed to air as much as possible.  Managing itching and discomfort  · Avoid hot showers or baths, which can make itching worse. A cold shower may help.  · Try taking a bath with:  ? Epsom salts. Follow  instructions on the packaging. You can get these at your local pharmacy or grocery store.  ? Baking soda. Pour a small amount into the bath as told by your health care provider.  ? Colloidal oatmeal. Follow  instructions on the packaging. You can get this at your local pharmacy or grocery store.  · Try applying baking soda paste to your skin. Stir water into baking soda until it reaches a paste-like consistency.  · Try applying calamine lotion. This is an over-the-counter lotion that helps to relieve itchiness.  · Keep cool and out of the sun. Sweating and being hot can make itching worse.  General instructions    · Rest as needed.  · Drink enough fluid to keep your urine pale yellow.  · Wear loose-fitting clothing.  · Avoid scented soaps, detergents, and perfumes. Use gentle soaps, detergents, perfumes, and other cosmetic products.  · Avoid any substance that causes your rash. Keep a journal to help track what causes your rash. Write down:  ? What you eat.  ? What cosmetic products you use.  ? What you drink.  ? What you wear. This includes jewelry.  · Keep all follow-up visits as told by your health care provider. This is important.  Contact a health care provider if:  · You sweat at night.  · You lose weight.  · You urinate more than normal.  · You urinate less than normal, or you notice that your urine is a darker color than  "usual.  · You feel weak.  · You vomit.  · Your skin or the whites of your eyes look yellow (jaundice).  · Your skin:  ? Tingles.  ? Is numb.  · Your rash:  ? Does not go away after several days.  ? Gets worse.  · You are:  ? Unusually thirsty.  ? More tired than normal.  · You have:  ? New symptoms.  ? Pain in your abdomen.  ? A fever.  ? Diarrhea.  Get help right away if you:  · Have a fever and your symptoms suddenly get worse.  · Develop confusion.  · Have a severe headache or a stiff neck.  · Have severe joint pains or stiffness.  · Have a seizure.  · Develop a rash that covers all or most of your body. The rash may or may not be painful.  · Develop blisters that:  ? Are on top of the rash.  ? Grow larger or grow together.  ? Are painful.  ? Are inside your nose or mouth.  · Develop a rash that:  ? Looks like purple pinprick-sized spots all over your body.  ? Has a \"bull's eye\" or looks like a target.  ? Is not related to sun exposure, is red and painful, and causes your skin to peel.  Summary  · A rash is a change in the color of your skin. Some rashes disappear after a few days, but some may last for a few weeks.  · The goal of treatment is to stop the itching and keep the rash from spreading.  · Take or apply over-the-counter and prescription medicines only as told by your health care provider.  · Contact a health care provider if you have new or worsening symptoms.  · Keep all follow-up visits as told by your health care provider. This is important.  This information is not intended to replace advice given to you by your health care provider. Make sure you discuss any questions you have with your health care provider.  Document Released: 12/08/2003 Document Revised: 07/22/2019 Document Reviewed: 07/22/2019  [employer] Interactive Patient Education © 2019 [employer]    Cellulitis, Adult    Cellulitis is a skin infection. The infected area is usually warm, red, swollen, and tender. This condition occurs most " often in the arms and lower legs. The infection can travel to the muscles, blood, and underlying tissue and become serious. It is very important to get treated for this condition.  What are the causes?  Cellulitis is caused by bacteria. The bacteria enter through a break in the skin, such as a cut, burn, insect bite, open sore, or crack.  What increases the risk?  This condition is more likely to occur in people who:  · Have a weak body defense system (immune system).  · Have open wounds on the skin, such as cuts, burns, bites, and scrapes. Bacteria can enter the body through these open wounds.  · Are older than 60 years of age.  · Have diabetes.  · Have a type of long-lasting (chronic) liver disease (cirrhosis) or kidney disease.  · Are obese.  · Have a skin condition such as:  ? Itchy rash (eczema).  ? Slow movement of blood in the veins (venous stasis).  ? Fluid buildup below the skin (edema).  · Have had radiation therapy.  · Use IV drugs.  What are the signs or symptoms?  Symptoms of this condition include:  · Redness, streaking, or spotting on the skin.  · Swollen area of the skin.  · Tenderness or pain when an area of the skin is touched.  · Warm skin.  · A fever.  · Chills.  · Blisters.  How is this diagnosed?  This condition is diagnosed based on a medical history and physical exam. You may also have tests, including:  · Blood tests.  · Imaging tests.  How is this treated?  Treatment for this condition may include:  · Medicines, such as antibiotic medicines or medicines to treat allergies (antihistamines).  · Supportive care, such as rest and application of cold or warm cloths (compresses) to the skin.  · Hospital care, if the condition is severe.  The infection usually starts to get better within 1-2 days of treatment.  Follow these instructions at home:    Medicines  · Take over-the-counter and prescription medicines only as told by your health care provider.  · If you were prescribed an antibiotic  medicine, take it as told by your health care provider. Do not stop taking the antibiotic even if you start to feel better.  General instructions  · Drink enough fluid to keep your urine pale yellow.  · Do not touch or rub the infected area.  · Raise (elevate) the infected area above the level of your heart while you are sitting or lying down.  · Apply warm or cold compresses to the affected area as told by your health care provider.  · Keep all follow-up visits as told by your health care provider. This is important. These visits let your health care provider make sure a more serious infection is not developing.  Contact a health care provider if:  · You have a fever.  · Your symptoms do not begin to improve within 1-2 days of starting treatment.  · Your bone or joint underneath the infected area becomes painful after the skin has healed.  · Your infection returns in the same area or another area.  · You notice a swollen bump in the infected area.  · You develop new symptoms.  · You have a general ill feeling (malaise) with muscle aches and pains.  Get help right away if:  · Your symptoms get worse.  · You feel very sleepy.  · You develop vomiting or diarrhea that persists.  · You notice red streaks coming from the infected area.  · Your red area gets larger or turns dark in color.  These symptoms may represent a serious problem that is an emergency. Do not wait to see if the symptoms will go away. Get medical help right away. Call your local emergency services (911 in the [place] Do not drive yourself to the hospital.  Summary  · Cellulitis is a skin infection. This condition occurs most often in the arms and lower legs.  · Treatment for this condition may include medicines, such as antibiotic medicines or antihistamines.  · Take over-the-counter and prescription medicines only as told by your health care provider. If you were prescribed an antibiotic medicine, do not stop taking the antibiotic even if you start to  feel better.  · Contact a health care provider if your symptoms do not begin to improve within 1-2 days of starting treatment or your symptoms get worse.  · Keep all follow-up visits as told by your health care provider. This is important. These visits let your health care provider make sure that a more serious infection is not developing.  This information is not intended to replace advice given to you by your health care provider. Make sure you discuss any questions you have with your health care provider.  Document Released: 09/27/2006 Document Revised: 05/09/2019 Document Reviewed: 05/09/2019  Elsevier Interactive Patient Education © 2019 [employer]

## 2020-01-07 NOTE — PROGRESS NOTES
CHIEF COMPLAINT  Chief Complaint   Patient presents with   • Rash       HPI  Kade Pereira is a 49 y.o. male  presents with complaint of rash on left forearm that he noticed Sunday evening.  The four lesions are red, slightly raised, severe itching.  The rash has not spread nor have the lesions enlarged since he noticed them.  He recalls that he did stay in a hotel the night before they popped up, but does not remember being bit by anything; denies allergies to materials, soaps, detergents, medication (other than listed allergies of ACE inhibitors and Lorabid); he has had shingles in the past and states this does not feel like shingles; he has been applying Caladryl lotion for the itching.    Review of Systems   Constitutional: Negative for chills and fever.   Skin: Positive for rash.   All other systems reviewed and are negative.      Past Medical History:   Diagnosis Date   • Hyperlipidemia    • Hypertension        Family History   Problem Relation Age of Onset   • Thyroid disease Mother    • Hypertension Mother    • Hypertension Father        Social History     Socioeconomic History   • Marital status:      Spouse name: Not on file   • Number of children: Not on file   • Years of education: Not on file   • Highest education level: Not on file   Tobacco Use   • Smoking status: Never Smoker   • Smokeless tobacco: Never Used   Substance and Sexual Activity   • Alcohol use: Yes     Comment: occasional   • Drug use: No         There were no vitals taken for this visit.    PHYSICAL EXAM  Physical Exam   Constitutional: He is oriented to person, place, and time. He appears well-developed and well-nourished. He is cooperative.   HENT:   Head: Normocephalic and atraumatic.   Neurological: He is alert and oriented to person, place, and time.   Skin:        No swelling, pain, or drainage from lesions         Kade was seen today for rash.    Diagnoses and all orders for this visit:    --treating with antibiotic  due to warmth at the site of the rash, even though there is no swelling or tenderness; he has Type 2 DM which makes him at greater risk for infection; discussed with Carlos Kelli and agrees with the treatment    Rash/skin eruption  -     triamcinolone (KENALOG) 0.5 % cream; Apply  topically to the appropriate area as directed 3 (Three) Times a Day.  -     sulfamethoxazole-trimethoprim (BACTRIM DS,SEPTRA DS) 800-160 MG per tablet; Take 1 tablet by mouth 2 (Two) Times a Day for 7 days.    --complete medication as prescribed  --keep area clean and dry  --may continue using Caladryl or Calamine lotion between topical steroid cream  --increase intake of clear, decaffeinated fluids  --monitor site for increasing warmth, redness, swelling, tenderness, or drainage    **If there is no improvement in rash over 3-5 days after beginning treatment, will need to follow-up with Dr. Basil Covington for further evalutions    **if he has worsening of symptoms, as mentioned above, he should follow-up with Dr. Covington, urgent care, or emergency department as soon as possible      FOLLOW-UP  As discussed with Dr. Basil Covington if no improvement or worsening of symptoms    Patient verbalizes understanding of medication dosage, comfort measures, instructions for treatment and follow-up.    CACHORRO Scott  01/07/2020  2:25 PM    This visit was performed via Telehealth.  This patient has been instructed to follow-up with their primary care provider if their symptoms worsen or the treatment provided does not resolve their illness.

## 2020-01-13 RX ORDER — ESOMEPRAZOLE MAGNESIUM 40 MG/1
40 CAPSULE, DELAYED RELEASE ORAL
Qty: 90 CAPSULE | Refills: 1 | Status: SHIPPED | OUTPATIENT
Start: 2020-01-13 | End: 2020-09-04

## 2020-03-11 DIAGNOSIS — E78.2 MIXED HYPERLIPIDEMIA: Primary | ICD-10-CM

## 2020-03-11 DIAGNOSIS — I10 ESSENTIAL HYPERTENSION: ICD-10-CM

## 2020-03-11 DIAGNOSIS — E11.9 TYPE 2 DIABETES MELLITUS WITHOUT COMPLICATION, WITHOUT LONG-TERM CURRENT USE OF INSULIN (HCC): ICD-10-CM

## 2020-04-06 RX ORDER — BISOPROLOL FUMARATE AND HYDROCHLOROTHIAZIDE 10; 6.25 MG/1; MG/1
TABLET ORAL
Qty: 90 TABLET | Refills: 1 | Status: SHIPPED | OUTPATIENT
Start: 2020-04-06 | End: 2020-06-04

## 2020-04-06 RX ORDER — LOSARTAN POTASSIUM AND HYDROCHLOROTHIAZIDE 25; 100 MG/1; MG/1
1 TABLET ORAL DAILY
Qty: 90 TABLET | Refills: 1 | Status: SHIPPED | OUTPATIENT
Start: 2020-04-06 | End: 2020-06-04

## 2020-04-06 RX ORDER — GLIMEPIRIDE 2 MG/1
TABLET ORAL
Qty: 90 TABLET | Refills: 1 | Status: SHIPPED | OUTPATIENT
Start: 2020-04-06 | End: 2020-07-27

## 2020-05-19 ENCOUNTER — TELEMEDICINE (OUTPATIENT)
Dept: FAMILY MEDICINE CLINIC | Facility: CLINIC | Age: 50
End: 2020-05-19

## 2020-05-19 ENCOUNTER — TELEMEDICINE (OUTPATIENT)
Dept: FAMILY MEDICINE CLINIC | Facility: TELEHEALTH | Age: 50
End: 2020-05-19

## 2020-05-19 DIAGNOSIS — R56.9 SEIZURE-LIKE ACTIVITY (HCC): Primary | ICD-10-CM

## 2020-05-19 DIAGNOSIS — E11.9 TYPE 2 DIABETES MELLITUS WITHOUT COMPLICATION, WITHOUT LONG-TERM CURRENT USE OF INSULIN (HCC): ICD-10-CM

## 2020-05-19 DIAGNOSIS — F25.9 SCHIZOAFFECTIVE DISORDER, UNSPECIFIED TYPE (HCC): ICD-10-CM

## 2020-05-19 DIAGNOSIS — S06.9X9S TRAUMATIC BRAIN INJURY WITH LOSS OF CONSCIOUSNESS, SEQUELA (HCC): ICD-10-CM

## 2020-05-19 DIAGNOSIS — R55 VASOVAGAL NEAR SYNCOPE: Primary | ICD-10-CM

## 2020-05-19 PROCEDURE — 99214 OFFICE O/P EST MOD 30 MIN: CPT | Performed by: FAMILY MEDICINE

## 2020-05-19 PROCEDURE — 99212 OFFICE O/P EST SF 10 MIN: CPT | Performed by: NURSE PRACTITIONER

## 2020-05-19 NOTE — PROGRESS NOTES
CHIEF COMPLAINT  Chief Complaint   Patient presents with   • Seizures       HPI  Kade Pereira is a 49 y.o. male  presents with complaint of a spell this morning; sitting, felt like head was shaking, eyes were shaking, unable to do anything, passed after 1-2 minutes and he felt fine.  He is diabetic, but did not check his glucose level.  Currently, he is only taking glimeperide for his diabetes; he has metformin, but does not like to take it because it causes diarrhea.  He does not want to go to the ER for evaluation as he has Schizoaffective disorder and does not do well in large open areas or around crowds; he has not contacted Dr. Covington his PCP    Review of Systems   Neurological: Positive for tremors and seizures.   All other systems reviewed and are negative.      Past Medical History:   Diagnosis Date   • Hyperlipidemia    • Hypertension        Family History   Problem Relation Age of Onset   • Thyroid disease Mother    • Hypertension Mother    • Hypertension Father        Social History     Socioeconomic History   • Marital status:      Spouse name: Not on file   • Number of children: Not on file   • Years of education: Not on file   • Highest education level: Not on file   Tobacco Use   • Smoking status: Never Smoker   • Smokeless tobacco: Never Used   Substance and Sexual Activity   • Alcohol use: Yes     Comment: occasional   • Drug use: No         There were no vitals taken for this visit.    PHYSICAL EXAM  Physical Exam   Constitutional: He is oriented to person, place, and time. He appears well-developed and well-nourished.   Speech was clear without slurring; unable to perform a thorough neuro exam via video   HENT:   Head: Normocephalic and atraumatic.   Neurological: He is alert and oriented to person, place, and time.   Flat fortino Braun was seen today for seizures.    Diagnoses and all orders for this visit:    Seizure-like activity (CMS/[facility])  --stressed the importance of going to  the ER for a thorough neurological evaluation--he refuses to go at this time  --recommended if not willing to go to ER--MUST call Dr. Covington or set up a virtual appointment with PCP for evaluation and discussion of appropriate course of management--he verbalizes understanding and states that he will try to set up an appointment to see his PCP today once the office opens; I urged him to go to [facility] and schedule an appointment now so he would be able to obtain a visit today; he did say that he would try, but I am unsure of his intentions at this time.      FOLLOW-UP  --Needs appointment with PCP/Virtual Care today   --Reiterated that if another episode/spell occurs that he should call 911 for immediate assistance    Patient verbalizes understanding of medication dosage, comfort measures, instructions for treatment and follow-up.    CACHORRO Scott  05/19/2020  8:24 AM    This visit was performed via Telehealth.  This patient has been instructed to follow-up with their primary care provider if their symptoms worsen or the treatment provided does not resolve their illness.

## 2020-05-19 NOTE — PROGRESS NOTES
Subjective   Kade Pereira is a 49 y.o. male.     No chief complaint on file.    CC: possible Seizure.    History of Present Illness    Coronavirus pandemic telehealth visit.  Good audio and video connection.  Patient gave informed consent through the [employer] check in process.    49-year-old male with history of morbid obesity, diabetes type 2, TBI, schizoaffective disorder complains of a possible seizure this morning.  He was sitting on the toilet for period time at 4 AM.  The lights were on.  He said his right leg fell asleep.  He shifted to one side.  And then all of a sudden he felt lightheaded.  His vision went black.  He fell his head shake back and forth.  He did not have vertigo.  He did not lose consciousness.  He did not have diaphoresis or tremors.  He did not have chest pain or palpitations.  The symptoms lasted about 1 minute.  Remembers all of it.  He did not fall to the side or fall to the floor.  When he was over he felt normal.  He then had a bowel movement.  He stood up and felt fine.  He had no recurrence of this.  No postictal state.  No drowsiness.  No loss of neurological function.    Diabetes type 2.  A1c was fairly well-controlled last visit about nearly a year ago.  He is not taking his metformin because of GI upset.    He continues to follow with his psychiatrist.  He has remote history of traumatic brain injury but no history of seizures.        The following portions of the patient's history were reviewed and updated as appropriate: allergies, current medications, past family history, past medical history, past social history, past surgical history and problem list.          Review of Systems   Constitutional: Negative.    Respiratory: Negative.    Cardiovascular: Negative.    Musculoskeletal: Negative.    Neurological: Negative for dizziness, tremors, facial asymmetry, weakness and headaches.   Psychiatric/Behavioral: The patient is nervous/anxious.        Objective   There were no  vitals taken for this visit.  Physical Exam    Assessment/Plan   Diagnoses and all orders for this visit:    Vasovagal near syncope    Type 2 diabetes mellitus without complication, without long-term current use of insulin (CMS/[facility])  -     Hemoglobin A1c; Future  -     Comprehensive Metabolic Panel; Future  -     Lipid Panel; Future    Schizoaffective disorder, unspecified type (CMS/[facility])    Traumatic brain injury with loss of consciousness, sequela (CMS/[facility])      Probable vasovagal events.  Very likely not a seizure, certainly not a grand mal seizure.  Cannot rule out partial seizure but less likely.  I am recommending observation.  Patient understands that if the symptoms recur he is to go directly to the hospital, emergency room.  Lab work is ordered.  He is due for recheck for his diabetes.  Counseling given today.    Traumatic brain injury.  No history of epilepsy.  I do not believe the above episode was a epileptic seizure.  However with any recurrent symptoms need to seek medical attention as above.    Diabetes type 2.  May need better control.  He is doing blood work soon I will see him in about a month for an office check.  He does have [place] phobia and fear of heights.  Next difficult for him to get into the office.    Duration of visit 25 minutes.

## 2020-06-04 RX ORDER — LOSARTAN POTASSIUM 100 MG/1
TABLET ORAL
Qty: 90 TABLET | Refills: 1 | Status: SHIPPED | OUTPATIENT
Start: 2020-06-04 | End: 2020-10-27

## 2020-06-04 RX ORDER — HYDROCHLOROTHIAZIDE 25 MG/1
TABLET ORAL
Qty: 90 TABLET | Refills: 1 | Status: SHIPPED | OUTPATIENT
Start: 2020-06-04 | End: 2020-10-27

## 2020-06-16 DIAGNOSIS — E11.9 TYPE 2 DIABETES MELLITUS WITHOUT COMPLICATION, WITHOUT LONG-TERM CURRENT USE OF INSULIN (HCC): ICD-10-CM

## 2020-06-18 ENCOUNTER — TELEMEDICINE (OUTPATIENT)
Dept: FAMILY MEDICINE CLINIC | Facility: TELEHEALTH | Age: 50
End: 2020-06-18

## 2020-06-18 DIAGNOSIS — B37.0 ORAL THRUSH: ICD-10-CM

## 2020-06-18 DIAGNOSIS — K12.0 CANKER SORE: Primary | ICD-10-CM

## 2020-06-18 PROCEDURE — 99213 OFFICE O/P EST LOW 20 MIN: CPT | Performed by: NURSE PRACTITIONER

## 2020-06-18 RX ORDER — FENOFIBRATE 160 MG/1
TABLET ORAL
COMMUNITY
Start: 2020-01-23 | End: 2020-06-18

## 2020-06-18 RX ORDER — FENOFIBRATE 160 MG/1
160 TABLET ORAL DAILY
COMMUNITY
End: 2020-09-11

## 2020-06-18 RX ORDER — DIPHENHYDRAMINE, LIDOCAINE, NYSTATIN
5 KIT ORAL 4 TIMES DAILY
Qty: 280 ML | Refills: 0 | Status: SHIPPED | OUTPATIENT
Start: 2020-06-18 | End: 2020-06-23

## 2020-06-18 NOTE — PATIENT INSTRUCTIONS
If symptoms worsen or do not improve follow up with your PCP or visit your nearest Urgent Care Center or ER.        Canker Sores    Canker sores are small, painful sores that develop inside your mouth. You can get one or more canker sores on the inside of your lips or cheeks, on your tongue, or anywhere inside your mouth. Canker sores cannot be passed from person to person (are not contagious). These sores are different from the sores that you may get on the outside of your lips (cold sores or fever blisters).  What are the causes?  The cause of this condition is not known. The condition may be passed down from a parent (genetic).  What increases the risk?  This condition is more likely to develop in:  · Women.  · People in their teens or 20s.  · Women who are having their menstrual period.  · People who are under a lot of emotional stress.  · People who do not get enough iron or B vitamins.  · People who do not take care of their mouth and teeth (have poor oral hygiene).  · People who have an injury inside the mouth, such as after having dental work or from chewing something hard.  What are the signs or symptoms?  Canker sores usually start as painful red bumps. Then they turn into small white, yellow, or gray sores that have red borders. The sores may be painful, and the pain may get worse when you eat or drink. Along with the canker sore, symptoms may also include:  · Fever.  · Fatigue.  · Swollen lymph nodes in your neck.  How is this diagnosed?  This condition may be diagnosed based on your symptoms and an exam of the inside of your mouth. If you get canker sores often or if they are very bad, you may have tests, such as:  · Blood tests to rule out possible causes.  · Swabbing a fluid sample from the sore to be tested for infection.  · Removing a small tissue sample from the sore (biopsy) to test it for cancer.  How is this treated?  Most canker sores go away without treatment in about 1 week. Home care is  usually the only treatment that you will need. Over-the-counter medicines can relieve discomfort. If you have severe canker sores, your health care provider may prescribe:  · Numbing ointment to relieve pain.  ? Do not use numbing gels or products containing benzocaine in children who are 2 years of age or younger.  · Vitamins.  · Steroid medicines. These may be given as pills, mouth rinses, or gels.  · Antibiotic mouth rinse.  Follow these instructions at home:    · Apply, take, or use over-the-counter and prescription medicines only as told by your health care provider. These include vitamins and ointments.  · If you were prescribed an antibiotic mouth rinse, use it as told by your health care provider. Do not stop using the antibiotic even if your condition improves.  · Until the sores are healed:  ? Do not drink coffee or citrus juices.  ? Do not eat spicy or salty foods.  · Use a mild, over-the-counter mouth rinse as recommended by your health care provider.  · Practice good oral hygiene by:  ? Flossing your teeth every day.  ? Brushing your teeth with a soft toothbrush twice each day.  Contact a health care provider if:  · Your symptoms do not get better after 2 weeks.  · You also have a fever or swollen glands in your neck.  · You get canker sores often.  · You have a canker sore that is getting larger.  · You cannot eat or drink due to your canker sores.  Summary  · Canker sores are small, painful sores that develop inside your mouth.  · Canker sores usually start as painful red bumps that turn into small white, yellow, or gray sores that have red borders.  · The sores may be quite painful, and the pain may get worse when you eat or drink.  · Most canker sores clear up without treatment in about 1 week. Home care is usually the only treatment that you will need. Over-the-counter medicines can relieve discomfort.  This information is not intended to replace advice given to you by your health care provider.  Make sure you discuss any questions you have with your health care provider.  Document Released: 04/13/2012 Document Revised: 11/30/2018 Document Reviewed: 09/24/2018  [employer] Patient Education © 2020 [employer]  Oral Thrush, Adult    Oral thrush is an infection in your mouth and throat. It causes white patches on your tongue and in your mouth.  Follow these instructions at home:  Helping with soreness    · To lessen your pain:  ? Drink cold liquids, like water and iced tea.  ? Eat frozen ice pops or frozen juices.  ? Eat foods that are easy to swallow, like gelatin and ice cream.  ? Drink from a straw if the patches in your mouth are painful.  General instructions  · Take or use over-the-counter and prescription medicines only as told by your doctor. Medicine for oral thrush may be something to swallow, or it may be something to put on the infected area.  · Eat plain yogurt that has live cultures in it. Read the label to make sure.  · If you wear dentures:  ? Take out your dentures before you go to bed.  ? Brush them well.  ? Soak them in a denture .  · Rinse your mouth with warm salt-water many times a day. To make the salt-water mixture, completely dissolve 1/2-1 teaspoon of salt in 1 cup of warm water.  Contact a doctor if:  · Your problems are getting worse.  · Your problems do not get better in less than 7 days with treatment.  · Your infection is spreading. This may show as white patches on the skin outside of your mouth.  · You are nursing your baby and you have redness and pain in the nipples.  This information is not intended to replace advice given to you by your health care provider. Make sure you discuss any questions you have with your health care provider.  Document Released: 03/14/2011 Document Revised: 03/22/2019 Document Reviewed: 09/11/2017  [employer] Patient Education © 2020 [employer]

## 2020-06-18 NOTE — PROGRESS NOTES
Subjective   Chief Complaint   Patient presents with   • Oral Pain     sore on tongue     This was a video visit, I spent a total of 20 minutes reviewing this chart.     Kade Pereira is a 49 y.o. male.     Pt states he has had a painful sore on the left side of his tongue for about 1 month. Over the past month symptoms have been off and on, the sore seems to heal and the pain decreases but returns soon after.     Oral Pain    This is a new problem. Episode onset: 1 month. The problem occurs constantly. The problem has been waxing and waning. The pain is mild. Pertinent negatives include no difficulty swallowing, facial pain, fever, oral bleeding, sinus pressure or thermal sensitivity. He has tried nothing for the symptoms.        Allergies   Allergen Reactions   • Ace Inhibitors    • Lorabid [Loracarbef]      rash       Past Medical History:   Diagnosis Date   • Hyperlipidemia    • Hypertension        Past Surgical History:   Procedure Laterality Date   • COLONOSCOPY     • HAND SURGERY     • WISDOM TOOTH EXTRACTION         Social History     Socioeconomic History   • Marital status:      Spouse name: Not on file   • Number of children: Not on file   • Years of education: Not on file   • Highest education level: Not on file   Tobacco Use   • Smoking status: Never Smoker   • Smokeless tobacco: Never Used   Substance and Sexual Activity   • Alcohol use: Yes     Comment: occasional   • Drug use: No       Family History   Problem Relation Age of Onset   • Thyroid disease Mother    • Hypertension Mother    • Hypertension Father          Current Outpatient Medications:   •  esomeprazole (nexIUM) 40 MG capsule, TAKE 1 CAPSULE BY MOUTH EVERY MORNING BEFORE BREAKFAST., Disp: 90 capsule, Rfl: 1  •  fenofibrate 160 MG tablet, Take 160 mg by mouth Daily., Disp: , Rfl:   •  FLUoxetine (PROzac) 20 MG capsule, TK ONE C PO  QAM, Disp: , Rfl: 1  •  furosemide (LASIX) 20 MG tablet, Take 1 tablet by mouth Daily As Needed  (edema)., Disp: 30 tablet, Rfl: 2  •  glimepiride (AMARYL) 2 MG tablet, TAKE 1 TABLET BY MOUTH EVERY DAY BEFORE BREAKFAST, Disp: 90 tablet, Rfl: 1  •  hydroCHLOROthiazide (HYDRODIURIL) 25 MG tablet, TAKE 1 TABLET BY MOUTH EVERY DAY, Disp: 90 tablet, Rfl: 1  •  losartan (COZAAR) 100 MG tablet, TAKE 1 TABLET BY MOUTH EVERY DAY, Disp: 90 tablet, Rfl: 1  •  metFORMIN ER (GLUCOPHAGE-XR) 500 MG 24 hr tablet, Take 1 tablet by mouth 2 (Two) Times a Day., Disp: 60 tablet, Rfl: 5  •  risperiDONE (risperDAL) 4 MG tablet, 4 mg. Take 1/2 by mouth every morning and 1 1/2 tablet every evening, Disp: , Rfl: 3  •  simvastatin (ZOCOR) 40 MG tablet, TAKE 1 TABLET BY MOUTH AT BEDTIME, Disp: 90 tablet, Rfl: 1  •  nystatin susp + lidocaine viscous (MAGIC MOUTHWASH) oral suspension, Swish and spit 5 mL 4 (Four) Times a Day for 14 days., Disp: 280 mL, Rfl: 0  •  triamcinolone (KENALOG) 0.5 % cream, Apply  topically to the appropriate area as directed 3 (Three) Times a Day., Disp: 15 g, Rfl: 1      Review of Systems   Constitutional: Negative for chills, diaphoresis, fatigue and fever.   HENT: Positive for mouth sores and swollen glands (right side of neck). Negative for congestion, sinus pressure, sore throat and trouble swallowing.    Respiratory: Negative.    Gastrointestinal: Negative.    Neurological: Negative for headache.        There were no vitals filed for this visit.    Objective   Physical Exam   Constitutional: He appears well-developed and well-nourished.   HENT:   Mouth/Throat: Oral lesions present.       Pulmonary/Chest: Effort normal.   Neurological: He is alert.   Psychiatric: He has a normal mood and affect. His speech is normal.        Procedures     Assessment/Plan   Kade was seen today for oral pain.    Diagnoses and all orders for this visit:    Canker sore  -     nystatin susp + lidocaine viscous (MAGIC MOUTHWASH) oral suspension; Swish and spit 5 mL 4 (Four) Times a Day for 14 days.    Oral thrush  -     nystatin  susp + lidocaine viscous (MAGIC MOUTHWASH) oral suspension; Swish and spit 5 mL 4 (Four) Times a Day for 14 days.        If symptoms worsen or do not improve follow up with your PCP or visit your nearest Urgent Care Center or ER.      PLAN: Discussed dosing, side effects, recommended other symptomatic care.  Patient should follow up with primary care provider if symptoms worsen, fail to resolve or other symptoms need attention. Patient/family agree to the above.     Mine Lagunas, APRN

## 2020-06-19 ENCOUNTER — LAB (OUTPATIENT)
Dept: LAB | Facility: HOSPITAL | Age: 50
End: 2020-06-19

## 2020-06-23 ENCOUNTER — OFFICE VISIT (OUTPATIENT)
Dept: FAMILY MEDICINE CLINIC | Facility: CLINIC | Age: 50
End: 2020-06-23

## 2020-06-23 VITALS
WEIGHT: 315 LBS | HEART RATE: 111 BPM | DIASTOLIC BLOOD PRESSURE: 103 MMHG | SYSTOLIC BLOOD PRESSURE: 151 MMHG | HEIGHT: 71 IN | TEMPERATURE: 99.5 F | BODY MASS INDEX: 44.1 KG/M2 | OXYGEN SATURATION: 97 %

## 2020-06-23 DIAGNOSIS — E78.2 MIXED HYPERLIPIDEMIA: ICD-10-CM

## 2020-06-23 DIAGNOSIS — I10 ESSENTIAL HYPERTENSION: Primary | ICD-10-CM

## 2020-06-23 DIAGNOSIS — S06.9X9S TRAUMATIC BRAIN INJURY WITH LOSS OF CONSCIOUSNESS, SEQUELA (HCC): ICD-10-CM

## 2020-06-23 DIAGNOSIS — F25.9 SCHIZOAFFECTIVE DISORDER, UNSPECIFIED TYPE (HCC): ICD-10-CM

## 2020-06-23 DIAGNOSIS — E66.01 MORBID OBESITY DUE TO EXCESS CALORIES (HCC): ICD-10-CM

## 2020-06-23 DIAGNOSIS — E11.9 TYPE 2 DIABETES MELLITUS WITHOUT COMPLICATION, WITHOUT LONG-TERM CURRENT USE OF INSULIN (HCC): ICD-10-CM

## 2020-06-23 PROCEDURE — 96160 PT-FOCUSED HLTH RISK ASSMT: CPT | Performed by: FAMILY MEDICINE

## 2020-06-23 PROCEDURE — 99214 OFFICE O/P EST MOD 30 MIN: CPT | Performed by: FAMILY MEDICINE

## 2020-06-23 PROCEDURE — G0439 PPPS, SUBSEQ VISIT: HCPCS | Performed by: FAMILY MEDICINE

## 2020-06-23 RX ORDER — SPIRONOLACTONE 25 MG/1
25 TABLET ORAL DAILY
Qty: 30 TABLET | Refills: 2 | Status: SHIPPED | OUTPATIENT
Start: 2020-06-23 | End: 2020-11-11

## 2020-06-23 RX ORDER — CLOTRIMAZOLE 1 %
CREAM (GRAM) TOPICAL 2 TIMES DAILY
Qty: 30 G | Refills: 0 | Status: SHIPPED | OUTPATIENT
Start: 2020-06-23 | End: 2020-11-11

## 2020-06-23 RX ORDER — TRIAMCINOLONE ACETONIDE 0.1 %
PASTE (GRAM) DENTAL
Qty: 5 G | Refills: 0 | Status: SHIPPED | OUTPATIENT
Start: 2020-06-23 | End: 2020-12-08

## 2020-06-23 RX ORDER — BISOPROLOL FUMARATE AND HYDROCHLOROTHIAZIDE 10; 6.25 MG/1; MG/1
1 TABLET ORAL DAILY
Start: 2020-06-23 | End: 2020-11-11

## 2020-06-23 NOTE — PROGRESS NOTES
Subjective   Kade Pereira is a 49 y.o. male.     Chief Complaint   Patient presents with   • Diabetes     was not able to get labs done    • Mouth Lesions     x 1 month under tongue    • Rash     left arm x 2-3 wks    • Medicare Wellness-subsequent        History of Present Illness    Diabetes type 2.  Unknown control.  A1c was 6.9% late last year.  Care complicated by [place] phobia and anxiety.  He does follow with a psychiatrist.  He is gained some weight.  He is not been very physically active because of the coronavirus pandemic.  He continues his glimepiride.  He will not take metformin because of GI upset.  Even once a day.  With the extended release.    Hypertension.  He continues bisoprolol hydrochlorothiazide 10- 6.25 mg.  He also taking hydrochlorothiazide additionally 25 mg.  Along with a separate tablet of losartan 100 mg daily.  Blood pressure is elevated here.  Has been elevated the last visit.  He does not check it at home.    Rash on left arm.  Edison-sized area on the antecubital fossa.  For a couple weeks.  No symptoms.    Ulcer on his tongue.  For about 3 weeks.  He did a telehealth visit.  They prescribed Magic Clau mouthwash.  However it was not filled because of a tactical issue reportedly.    Schizoaffective disorder.  Followed by his psychiatrist.    TBI, no change in behavior.    The following portions of the patient's history were reviewed and updated as appropriate: allergies, current medications, past family history, past medical history, past social history, past surgical history and problem list.          Review of Systems   Constitutional: Positive for fatigue.   Cardiovascular: Positive for leg swelling. Negative for chest pain.   Endocrine: Negative for polydipsia, polyphagia and polyuria.   Skin: Negative for pallor.   Neurological: Positive for headaches. Negative for dizziness, tremors, seizures and speech difficulty.   Psychiatric/Behavioral: Negative for confusion. The patient  "is nervous/anxious.        Objective   Blood pressure (!) 151/103, pulse 111, temperature 99.5 °F (37.5 °C), temperature source Oral, height 180.3 cm (70.98\"), weight (!) 195 kg (430 lb), SpO2 97 %.  Physical Exam   Constitutional: He appears well-developed and well-nourished. No distress.   Neck: No thyromegaly present.   Cardiovascular: Normal rate, regular rhythm, normal heart sounds and intact distal pulses.   Pulmonary/Chest: Effort normal and breath sounds normal.   Musculoskeletal: He exhibits edema.   Venous stasis noted.  With some dermatitis changes.  +2 tense edema.    Kade had a diabetic foot exam performed today.  Vascular Status -  His right foot exhibits abnormal foot edema. His left foot exhibits abnormal foot edema.  Skin Integrity  -  His right foot skin is intact.His left foot skin is intact..  Skin: Skin is warm and dry.   Psychiatric: He has a normal mood and affect. His behavior is normal. Judgment and thought content normal.   Nursing note and vitals reviewed.      Assessment/Plan  Answers for HPI/ROS submitted by the patient on 6/23/2020   Diabetes problem  What is the primary reason for your visit?: Diabetes    Kade was seen today for diabetes, mouth lesions, rash and medicare wellness-subsequent.    Diagnoses and all orders for this visit:    Essential hypertension    Traumatic brain injury with loss of consciousness, sequela (CMS/HCC)    Schizoaffective disorder, unspecified type (CMS/[facility])    Morbid obesity due to excess calories (CMS/[facility])    Type 2 diabetes mellitus without complication, without long-term current use of insulin (CMS/[facility])    Mixed hyperlipidemia    Other orders  -     bisoprolol-hydrochlorothiazide (ZIAC) 10-6.25 MG per tablet; Take 1 tablet by mouth Daily.  -     spironolactone (Aldactone) 25 MG tablet; Take 1 tablet by mouth Daily.  -     triamcinolone (KENALOG) 0.1 % paste; Apply to mouth sore twice a day as needed  -     clotrimazole (LOTRIMIN) 1 % cream; Apply  " topically to the appropriate area as directed 2 (Two) Times a Day.      Hypertension.  Needs better control.  Continue Ziac, continue hydrochlorothiazide additionally, continue losartan, adding spironolactone at low-dose 25 mg a day.  Checking lab work today and also prior to next visit.  Potassium 4.1 and also normal creatinine 7 months ago.    Diabetes type 2.  Continue glimepiride.  Unable to tolerate metformin.  If A1c higher add Victoza or Trulicity pending insurance.    Morbid obesity.  Counseling given.    Traumatic brain injury, schizoaffective disorder, followed by psychiatry.    Hyperlipidemia.  Continue simvastatin and fenofibrate.    Immunizations.  Patient declined pneumococcal vaccination today    See me in 3 months for follow-up.  Lab work prior.

## 2020-06-23 NOTE — PROGRESS NOTES
The ABCs of the Annual Wellness Visit  Subsequent Medicare Wellness Visit    Chief Complaint   Patient presents with   • Diabetes     was not able to get labs done    • Mouth Lesions     x 1 month under tongue    • Rash     left arm x 2-3 wks    • Medicare Wellness-subsequent       Subjective   History of Present Illness:  Kade Pereira is a 49 y.o. male who presents for a Subsequent Medicare Wellness Visit.    HEALTH RISK ASSESSMENT    Recent Hospitalizations:  No hospitalization(s) within the last year.    Current Medical Providers:  Patient Care Team:  Basil Covington MD as PCP - General (Family Medicine)    Smoking Status:  Social History     Tobacco Use   Smoking Status Never Smoker   Smokeless Tobacco Never Used       Alcohol Consumption:  Social History     Substance and Sexual Activity   Alcohol Use Yes    Comment: occasional       Depression Screen:   PHQ-2/PHQ-9 Depression Screening 6/23/2020   Little interest or pleasure in doing things 0   Feeling down, depressed, or hopeless 0   Trouble falling or staying asleep, or sleeping too much -   Feeling tired or having little energy -   Poor appetite or overeating -   Feeling bad about yourself - or that you are a failure or have let yourself or your family down -   Trouble concentrating on things, such as reading the newspaper or watching television -   Moving or speaking so slowly that other people could have noticed. Or the opposite - being so fidgety or restless that you have been moving around a lot more than usual -   Thoughts that you would be better off dead, or of hurting yourself in some way -   Total Score 0   If you checked off any problems, how difficult have these problems made it for you to do your work, take care of things at home, or get along with other people? -       Fall Risk Screen:  ROSSY Fall Risk Assessment has not been completed.    Health Habits and Functional and Cognitive Screening:  Functional & Cognitive Status 6/23/2020   Do  you have difficulty preparing food and eating? No   Do you have difficulty bathing yourself, getting dressed or grooming yourself? Yes   Do you have difficulty using the toilet? Yes   Do you have difficulty moving around from place to place? No   Do you have trouble with steps or getting out of a bed or a chair? Yes   Current Diet Unhealthy Diet   Dental Exam Not up to date   Eye Exam Not up to date   Exercise (times per week) 0 times per week   Current Exercise Activities Include None   Do you need help using the phone?  No   Are you deaf or do you have serious difficulty hearing?  No   Do you need help with transportation? No   Do you need help shopping? Yes   Do you need help preparing meals?  No   Do you need help with housework?  Yes   Do you need help with laundry? Yes   Do you need help taking your medications? No   Do you need help managing money? No   Do you ever drive or ride in a car without wearing a seat belt? Yes   Have you felt unusual stress, anger or loneliness in the last month? No   Who do you live with? Spouse   If you need help, do you have trouble finding someone available to you? No   Have you been bothered in the last four weeks by sexual problems? Yes   Do you have difficulty concentrating, remembering or making decisions? Yes         Does the patient have evidence of cognitive impairment? No    Asprin use counseling:Does not need ASA (and currently is not on it)    Age-appropriate Screening Schedule:  Refer to the list below for future screening recommendations based on patient's age, sex and/or medical conditions. Orders for these recommended tests are listed in the plan section. The patient has been provided with a written plan.    Health Maintenance   Topic Date Due   • URINE MICROALBUMIN  1971   • TDAP/TD VACCINES (1 - Tdap) 08/02/1982   • PNEUMOCOCCAL VACCINE (19-64 MEDIUM RISK) (1 of 1 - PPSV23) 08/02/1990   • DIABETIC FOOT EXAM  04/13/2016   • DIABETIC EYE EXAM  04/13/2016   •  COLONOSCOPY  04/13/2016   • HEMOGLOBIN A1C  05/20/2020   • INFLUENZA VACCINE  08/01/2020   • LIPID PANEL  11/20/2020          The following portions of the patient's history were reviewed and updated as appropriate: allergies, current medications, past family history, past medical history, past social history, past surgical history and problem list.    Outpatient Medications Prior to Visit   Medication Sig Dispense Refill   • esomeprazole (nexIUM) 40 MG capsule TAKE 1 CAPSULE BY MOUTH EVERY MORNING BEFORE BREAKFAST. 90 capsule 1   • fenofibrate 160 MG tablet Take 160 mg by mouth Daily.     • FLUoxetine (PROzac) 20 MG capsule TK ONE C PO  QAM  1   • glimepiride (AMARYL) 2 MG tablet TAKE 1 TABLET BY MOUTH EVERY DAY BEFORE BREAKFAST 90 tablet 1   • hydroCHLOROthiazide (HYDRODIURIL) 25 MG tablet TAKE 1 TABLET BY MOUTH EVERY DAY 90 tablet 1   • losartan (COZAAR) 100 MG tablet TAKE 1 TABLET BY MOUTH EVERY DAY 90 tablet 1   • risperiDONE (risperDAL) 4 MG tablet 4 mg. Take 1/2 by mouth every morning and 1 1/2 tablet every evening  3   • simvastatin (ZOCOR) 40 MG tablet TAKE 1 TABLET BY MOUTH AT BEDTIME 90 tablet 1   • triamcinolone (KENALOG) 0.5 % cream Apply  topically to the appropriate area as directed 3 (Three) Times a Day. 15 g 1   • furosemide (LASIX) 20 MG tablet Take 1 tablet by mouth Daily As Needed (edema). 30 tablet 2   • metFORMIN ER (GLUCOPHAGE-XR) 500 MG 24 hr tablet Take 1 tablet by mouth 2 (Two) Times a Day. 60 tablet 5   • nystatin susp + lidocaine viscous (MAGIC MOUTHWASH) oral suspension Swish and spit 5 mL 4 (Four) Times a Day for 14 days. 280 mL 0     No facility-administered medications prior to visit.        Patient Active Problem List   Diagnosis   • Mixed hyperlipidemia   • Essential hypertension   • Schizoaffective disorder (CMS/HCC)   • Traumatic brain injury (CMS/HCC)   • Morbid obesity due to excess calories (CMS/HCC)   • Venous stasis dermatitis of left lower extremity   • Gastroesophageal  "reflux disease   • Obstructive sleep apnea   • Type 2 diabetes mellitus without complication, without long-term current use of insulin (CMS/[facility])   • Agoraphobia   • Vasovagal near syncope       Advanced Care Planning:  ACP discussion was held with the patient during this visit. Patient does not have an advance directive, information provided.    Review of Systems    Compared to one year ago, the patient feels his physical health is the same.  Compared to one year ago, the patient feels his mental health is the same.    Reviewed chart for potential of high risk medication in the elderly: yes  Reviewed chart for potential of harmful drug interactions in the elderly:yes    Objective         Vitals:    06/23/20 1447   BP: (!) 151/103   Pulse: 111   Temp: 99.5 °F (37.5 °C)   TempSrc: Oral   SpO2: 97%   Weight: (!) 195 kg (430 lb)   Height: 180.3 cm (70.98\")       Body mass index is 60 kg/m².  Discussed the patient's BMI with him. The BMI is very high. Exercise needed. .    Physical Exam          Assessment/Plan   Medicare Risks and Personalized Health Plan  CMS Preventative Services Quick Reference  Advance Directive Discussion  Immunizations Discussed/Encouraged (specific immunizations; Pneumococcal 23 )    The above risks/problems have been discussed with the patient.  Pertinent information has been shared with the patient in the After Visit Summary.  Follow up plans and orders are seen below in the Assessment/Plan Section.    Diagnoses and all orders for this visit:    1. Essential hypertension (Primary)    2. Traumatic brain injury with loss of consciousness, sequela (CMS/[facility])    3. Schizoaffective disorder, unspecified type (CMS/[facility])    4. Morbid obesity due to excess calories (CMS/[facility])    5. Type 2 diabetes mellitus without complication, without long-term current use of insulin (CMS/[facility])    6. Mixed hyperlipidemia    Other orders  -     bisoprolol-hydrochlorothiazide (ZIAC) 10-6.25 MG per tablet; Take 1 tablet by " mouth Daily.  -     spironolactone (Aldactone) 25 MG tablet; Take 1 tablet by mouth Daily.  Dispense: 30 tablet; Refill: 2      Follow Up:  No follow-ups on file.     An After Visit Summary and PPPS were given to the patient.             Answers for HPI/ROS submitted by the patient on 6/23/2020   Diabetes problem  What is the primary reason for your visit?: Diabetes  Diabetes type: type 2  MedicAlert ID: [id number]  Disease duration: 8 months  blurred vision: No  foot paresthesias: Yes  foot ulcerations: No  visual change: Yes  weight loss: No  Symptom course: worsening  hunger: No  mood changes: No  sleepiness: Yes  sweats: No  blackouts: No  hospitalization: No  nocturnal hypoglycemia: No  required assistance: No  required glucagon: No  CVA: No  heart disease: No  impotence: Yes  nephropathy: No  peripheral neuropathy: No  PVD: No  retinopathy: No  CAD risks: dyslipidemia, family history, hypertension, obesity, sedentary lifestyle, stress  Current treatments: none

## 2020-07-02 RX ORDER — SIMVASTATIN 40 MG
TABLET ORAL
Qty: 90 TABLET | Refills: 1 | Status: SHIPPED | OUTPATIENT
Start: 2020-07-02 | End: 2020-10-27 | Stop reason: SDUPTHER

## 2020-07-07 ENCOUNTER — TELEMEDICINE (OUTPATIENT)
Dept: FAMILY MEDICINE CLINIC | Facility: CLINIC | Age: 50
End: 2020-07-07

## 2020-07-07 DIAGNOSIS — K14.0 TONGUE ULCER: Primary | ICD-10-CM

## 2020-07-07 PROCEDURE — 99213 OFFICE O/P EST LOW 20 MIN: CPT | Performed by: FAMILY MEDICINE

## 2020-07-07 NOTE — PROGRESS NOTES
Subjective   Kade Pereira is a 49 y.o. male.     Chief Complaint   Patient presents with   • Mouth Lesions        History of Present Illness    Coronavirus pandemic telehealth visit.  Good audio and video connection.  Patient gave informed consent through the [employer] check in process.    Tongue lesion.  Also her left tongue.  Is been there for about 2 months.  I saw her about 3 weeks ago.  Gave him some dental paste with Kenalog.  Symptoms improved somewhat but it is not getting any better I think it is getting bigger.  Never smoker.  No other specific symptoms.  Still waiting for him to get his diabetic blood work done.       The following portions of the patient's history were reviewed and updated as appropriate: allergies, current medications, past family history, past medical history, past social history, past surgical history and problem list.          Review of Systems   Constitutional: Negative.    HENT: Positive for mouth sores. Negative for sore throat and voice change.    Respiratory: Negative.        Objective   There were no vitals taken for this visit.  Physical Exam   Constitutional: No distress.   HENT:   Head: Atraumatic.   Fairly good A/V connection today.  There is a large approximately 1 cm aphthous ulcer on the left side of his anterior lateral tongue.  Appears to be bigger than last visit.  The video may be exaggerating the size of the lesion.       Assessment/Plan   Kade was seen today for mouth lesions.    Diagnoses and all orders for this visit:    Tongue ulcer  -     Ambulatory Referral to ENT (Otolaryngology)      Tongue ulcer.  At least 2 months duration.  Getting bigger.  I recommending ENT evaluation.  May need biopsy.  Patient is aware that I am making the referral.  I also gave him the name of the ENT surgeon.  Patient understands to contact us if he does not hear from us within a couple of days.    Duration of visit 15 minutes

## 2020-07-22 ENCOUNTER — TRANSCRIBE ORDERS (OUTPATIENT)
Dept: ADMINISTRATIVE | Facility: HOSPITAL | Age: 50
End: 2020-07-22

## 2020-07-22 DIAGNOSIS — R22.1 NECK MASS: Primary | ICD-10-CM

## 2020-07-27 RX ORDER — GLIMEPIRIDE 2 MG/1
TABLET ORAL
Qty: 90 TABLET | Refills: 1 | Status: SHIPPED | OUTPATIENT
Start: 2020-07-27 | End: 2020-10-27 | Stop reason: SDUPTHER

## 2020-07-28 ENCOUNTER — APPOINTMENT (OUTPATIENT)
Dept: CT IMAGING | Facility: HOSPITAL | Age: 50
End: 2020-07-28

## 2020-07-28 ENCOUNTER — TELEPHONE (OUTPATIENT)
Dept: FAMILY MEDICINE CLINIC | Facility: CLINIC | Age: 50
End: 2020-07-28

## 2020-07-28 NOTE — TELEPHONE ENCOUNTER
With his health conditions, I cannot recommend he go to the wedding.  Unless he is wearing an N 95 mask perhaps... Even then it would be risky.  I know this is very difficult for him because of his son's wedding, but he is at high risk for complications from COVID-19.    Dr. No has been in touch with me about the biopsy.    He is welcome to make a telehealth visit with me if he is to further discuss.

## 2020-07-28 NOTE — TELEPHONE ENCOUNTER
PATIENT CALLED AND STATES HIS SONS WEDDING IS THIS WEEKEND. THE WEDDING IS IN [place] AND THERE WILL BE ABOUT 200-300 PEOPLE THERE.    HE HAS A SPOT ON HIS TONGUE AND A LUMP IN HIS NECK. HE WILL BE DOING CT SCAN ON NECK AND BIOPSY ON TONGUE AFTER THE WEDDING.    ARE THERE PRECAUTIONS THAT HE CAN DO SO HE CAN GO TO THE WEDDING.  HE WANTS TO KNOW IF IT WOULD BE SAFE FOR HIM TO GO TO HIS SON'S W[place]ING.     PLEASE CALL [phone]   PLEASE ADVISE

## 2020-07-29 ENCOUNTER — TELEPHONE (OUTPATIENT)
Dept: FAMILY MEDICINE CLINIC | Facility: CLINIC | Age: 50
End: 2020-07-29

## 2020-07-29 NOTE — TELEPHONE ENCOUNTER
Patient calling and would like to know if he can attend a wedding and wear an N95 mask with an oxyen tank so he can breath. Would like advice. The rehearsal is tomorrow and the wedding is Friday.    Please call and advise at [phone].

## 2020-08-10 ENCOUNTER — OFFICE VISIT (OUTPATIENT)
Dept: CARDIOLOGY | Facility: CLINIC | Age: 50
End: 2020-08-10

## 2020-08-10 VITALS
SYSTOLIC BLOOD PRESSURE: 144 MMHG | WEIGHT: 315 LBS | DIASTOLIC BLOOD PRESSURE: 84 MMHG | HEART RATE: 86 BPM | HEIGHT: 71 IN | BODY MASS INDEX: 44.1 KG/M2

## 2020-08-10 DIAGNOSIS — I10 ESSENTIAL HYPERTENSION: ICD-10-CM

## 2020-08-10 DIAGNOSIS — Z01.810 PREOP CARDIOVASCULAR EXAM: Primary | ICD-10-CM

## 2020-08-10 DIAGNOSIS — E78.2 MIXED HYPERLIPIDEMIA: ICD-10-CM

## 2020-08-10 DIAGNOSIS — I89.0 LYMPHEDEMA: ICD-10-CM

## 2020-08-10 PROCEDURE — 99204 OFFICE O/P NEW MOD 45 MIN: CPT | Performed by: INTERNAL MEDICINE

## 2020-08-10 PROCEDURE — 93000 ELECTROCARDIOGRAM COMPLETE: CPT | Performed by: INTERNAL MEDICINE

## 2020-08-10 NOTE — PROGRESS NOTES
[facility] New Patient Office Note     Encounter Date:08/10/20  Patient:Kade Pereira  :1970  MRN:[id number]    Referring Provider: Basil Stanford MD    Consulted for: Preoperative evaluation    Chief Complaint:   Chief Complaint   Patient presents with   • Surgical Clearance     History of Presenting Illness:      Mr. Pereira is a 50 y.o. gentleman with past medical history notable for morbid obesity, diabetes type 2 on oral therapy, hypertension, mixed hyperlipidemia, obstructive sleep apnea, lymphedema, and traumatic brain injury who presents to our office for initial evaluation regarding upcoming biopsy and preoperative clearance.  General from a cardiac standpoint patient is doing fairly well denies any overt episodes of chest discomfort.  Unfortunately patient does have significant shortness of breath.  Patient states that over the last year he will typically get winded walking across the room.  He denies any associated symptoms such as chest discomfort.  He does have associated palpitations and racing heart rate when he does any sort of exercise.  He denies any presyncopal or syncopal episodes.  He has had longstanding lower extremity edema which to me appears to be lymphedema.  This is been going on for many years.  He does have associated skin lesions from chronic venous insufficiency.  This is been managed with Spironolactone and hydrochlorothiazide.    With regards to his hypertension this is been under moderate control on his current medical regimen.  No major changes have been required with his medical regimen as of late.    With regards to his mixed hyperlipidemia has been on chronic simvastatin therapy with no significant side effects.    He is on oral therapy for management of his diabetes with reasonable A1c control.    He does have plans for a tongue biopsy and is needing preoperative clearance for this.  He also has a neck mass for which he is getting a CT scan to further  evaluate.      Review of Systems:  Review of Systems   Constitution: Positive for malaise/fatigue.   HENT: Positive for ear pain and sore throat.    Eyes: Positive for blurred vision.   Cardiovascular: Positive for claudication and leg swelling.   Respiratory: Positive for shortness of breath and snoring.    Endocrine: Negative.    Skin: Negative.    Musculoskeletal: Negative.    Gastrointestinal: Negative.    Genitourinary: Positive for decreased libido.   Neurological: Positive for excessive daytime sleepiness, headaches and numbness.   Psychiatric/Behavioral: The patient is nervous/anxious.    Allergic/Immunologic: Negative.        Current Outpatient Medications on File Prior to Visit   Medication Sig Dispense Refill   • bisoprolol-hydrochlorothiazide (ZIAC) 10-6.25 MG per tablet Take 1 tablet by mouth Daily.     • esomeprazole (nexIUM) 40 MG capsule TAKE 1 CAPSULE BY MOUTH EVERY MORNING BEFORE BREAKFAST. 90 capsule 1   • fenofibrate 160 MG tablet Take 160 mg by mouth Daily.     • glimepiride (AMARYL) 2 MG tablet TAKE 1 TABLET BY MOUTH EVERY DAY BEFORE BREAKFAST 90 tablet 1   • losartan (COZAAR) 100 MG tablet TAKE 1 TABLET BY MOUTH EVERY DAY 90 tablet 1   • risperiDONE (risperDAL) 4 MG tablet 4 mg. Take 1/2 by mouth every morning and 1 1/2 tablet every evening  3   • simvastatin (ZOCOR) 40 MG tablet TAKE 1 TABLET BY MOUTH AT BEDTIME 90 tablet 1   • spironolactone (Aldactone) 25 MG tablet Take 1 tablet by mouth Daily. 30 tablet 2   • triamcinolone (KENALOG) 0.1 % paste Apply to mouth sore twice a day as needed 5 g 0   • triamcinolone (KENALOG) 0.5 % cream Apply  topically to the appropriate area as directed 3 (Three) Times a Day. 15 g 1   • clotrimazole (LOTRIMIN) 1 % cream Apply  topically to the appropriate area as directed 2 (Two) Times a Day. 30 g 0   • FLUoxetine (PROzac) 20 MG capsule TK ONE C PO  QAM  1   • hydroCHLOROthiazide (HYDRODIURIL) 25 MG tablet TAKE 1 TABLET BY MOUTH EVERY DAY 90 tablet 1     No  "current facility-administered medications on file prior to visit.        Allergies   Allergen Reactions   • Ace Inhibitors    • Lorabid [Loracarbef]      rash       Past Medical History:   Diagnosis Date   • Agoraphobia    • Hyperlipidemia    • Hypertension    • Schizoaffective disorder (CMS/HCC)    • Traumatic brain injury with loss of consciousness (CMS/HCC)    • Type 2 diabetes mellitus without complication, without long-term current use of insulin (CMS/[facility])        Past Surgical History:   Procedure Laterality Date   • COLONOSCOPY     • HAND SURGERY     • WISDOM TOOTH EXTRACTION         Social History     Socioeconomic History   • Marital status:      Spouse name: Not on file   • Number of children: Not on file   • Years of education: Not on file   • Highest education level: Not on file   Tobacco Use   • Smoking status: Never Smoker   • Smokeless tobacco: Never Used   • Tobacco comment: caffeine use    Substance and Sexual Activity   • Alcohol use: Yes     Comment: occasional   • Drug use: No       Family History   Problem Relation Age of Onset   • Thyroid disease Mother    • Hypertension Mother    • Hypertension Father        The following portions of the patient's history were reviewed and updated as appropriate: allergies, current medications, past family history, past medical history, past social history, past surgical history and problem list.       Objective:       Vitals:    08/10/20 1432   BP: 144/84   BP Location: Right arm   Patient Position: Sitting   Pulse: 86   Weight: (!) 194 kg (428 lb 3.2 oz)   Height: 180.3 cm (70.98\")       Physical Exam:  Constitutional: Well appearing, morbidly obese ambulating with a cane, no acute distress   HENT: Oropharynx clear and membrane moist  Eyes: Normal conjunctiva, no sclera icterus.  Neck: Supple, no carotid bruit bilaterally.  Cardiovascular: Regular rate and rhythm, No Murmur, 3+ bilateral lower extremity edema.  Pulmonary: Normal respiratory effort, " normal lung sounds, no wheezing.  Abdominal: Soft, nontender, no hepatosplenomegaly, liver is non-pulsatile.  Neurological: Alert and orient x 3.   Skin: Warm, dry, no ecchymosis, no rash.  Psych: Appropriate mood and affect. Normal judgment and insight.      Lab Results   Component Value Date    BUN 14 11/20/2019    CREATININE 0.92 11/20/2019    EGFRIFNONA 88 11/20/2019    EGFRIFAFRI 106 11/20/2019    BCR 15.2 11/20/2019    K 4.1 11/20/2019    CO2 29.4 (H) 11/20/2019    CALCIUM 9.2 11/20/2019    PROTENTOTREF 6.4 11/20/2019    ALBUMIN 4.00 11/20/2019    LABIL2 1.7 11/20/2019    AST 22 11/20/2019    ALT 38 11/20/2019       No results found for: WBC, HGB, HCT, MCV, PLT    No results found for: CKTOTAL, CKMB, CKMBINDEX, TROPONINI, TROPONINT    Lab Results   Component Value Date    CHLPL 110 11/20/2019    CHLPL 129 04/16/2019    CHLPL 96 01/15/2018     Lab Results   Component Value Date    TRIG 158 (H) 11/20/2019    TRIG 168 (H) 04/16/2019    TRIG 156 (H) 01/15/2018     Lab Results   Component Value Date    HDL 27 (L) 11/20/2019    HDL 29 (L) 04/16/2019    HDL 23 (L) 01/15/2018     Lab Results   Component Value Date    LDL 51 11/20/2019    LDL 66 04/16/2019    LDL 42 01/15/2018       Lab Results   Component Value Date    TSH 1.96 04/21/2016         ECG 12 Lead  Date/Time: 8/10/2020 3:10 PM  Performed by: Luther Zhang MD  Authorized by: Luther Zhang MD   Previous ECG: no previous ECG available  Rhythm: sinus rhythm  QRS axis: right    Clinical impression: non-specific ECG                  Assessment:          Diagnosis Plan   1. Preop cardiovascular exam  Adult Transthoracic Echo Complete W/ Cont if Necessary Per Protocol   2. Essential hypertension     3. Mixed hyperlipidemia     4. Lymphedema  Ambulatory Referral to Lymphedema Clinic          Plan:       Mr. Pereira is a 50 y.o. gentleman with past medical history notable for morbid obesity, diabetes type 2 on oral therapy, hypertension, mixed  hyperlipidemia, obstructive sleep apnea, lymphedema, and traumatic brain injury who presents to our office for initial evaluation regarding upcoming biopsy and preoperative clearance.  I do think that further evaluation with an echocardiogram is warranted at this time given his dyspnea on exertion.  This would also help us identify if there is any significant congestive heart failure explaining some of his leg edema.  I think most of his leg edema is lymphedema given the chronic nature of it related to his obesity and I have also referred him to our lymphedema clinic to help manage his leg swelling.  I would recommend continue with his current diuretic regimen but can obviously adjust this based upon his echocardiogram results.  With regards to his upcoming surgery as long as his echo does not show any significant abnormalities he may proceed forward without any further testing with his biopsy.  However if he is needing more extensive radical neck surgery if his neck CT shows more extensive disease would recommend a stress test at that time prior to proceeding with more high risk surgery.    Preoperative evaluation:  · We will follow-up on echocardiogram which if normal may proceed with tongue biopsy  · If needing more extensive surgery would consider stress testing at that time.    Lymphedema:  · Extensive lymphedema noted and referral to lymphedema clinic  · Continue with current diuretic regimen  · We will follow-up on echocardiogram    Hypertension:  · Blood pressure reasonably controlled continue current medical therapy    Mixed hyperlipidemia:  · Continue statin therapy    Follow-up:  4 weeks    Thank you for allowing me to participate in the care of Kade Pereira. Feel free to contact me directly with any further questions or concerns.    Luther Zhang MD  [facility]  08/10/20  15:10

## 2020-08-17 ENCOUNTER — TRANSCRIBE ORDERS (OUTPATIENT)
Dept: ADMINISTRATIVE | Facility: HOSPITAL | Age: 50
End: 2020-08-17

## 2020-08-17 DIAGNOSIS — R22.1 MASS OF RIGHT SIDE OF NECK: Primary | ICD-10-CM

## 2020-08-20 ENCOUNTER — HOSPITAL ENCOUNTER (OUTPATIENT)
Dept: CT IMAGING | Facility: HOSPITAL | Age: 50
Discharge: HOME | End: 2020-08-20

## 2020-08-20 ENCOUNTER — HOSPITAL ENCOUNTER (OUTPATIENT)
Dept: CARDIOLOGY | Facility: HOSPITAL | Age: 50
Discharge: HOME | End: 2020-08-20
Admitting: INTERNAL MEDICINE

## 2020-08-20 VITALS — HEIGHT: 71 IN | WEIGHT: 315 LBS | HEART RATE: 98 BPM | BODY MASS INDEX: 44.1 KG/M2

## 2020-08-20 DIAGNOSIS — Z01.810 PREOP CARDIOVASCULAR EXAM: ICD-10-CM

## 2020-08-20 DIAGNOSIS — R22.1 MASS OF RIGHT SIDE OF NECK: ICD-10-CM

## 2020-08-20 LAB
AORTIC ARCH: 1.5 CM
ASCENDING AORTA: 3.9 CM
BH CV ECHO MEAS - ACS: 2.3 CM
BH CV ECHO MEAS - AO MAX PG (FULL): 1.8 MMHG
BH CV ECHO MEAS - AO MAX PG: 3.9 MMHG
BH CV ECHO MEAS - AO MEAN PG (FULL): 1.6 MMHG
BH CV ECHO MEAS - AO MEAN PG: 2.6 MMHG
BH CV ECHO MEAS - AO V2 MAX: 98.5 CM/SEC
BH CV ECHO MEAS - AO V2 MEAN: 76.2 CM/SEC
BH CV ECHO MEAS - AO V2 VTI: 19.9 CM
BH CV ECHO MEAS - ASC AORTA: 3.9 CM
BH CV ECHO MEAS - AVA(I,A): 3 CM^2
BH CV ECHO MEAS - AVA(I,D): 3 CM^2
BH CV ECHO MEAS - AVA(V,A): 3.2 CM^2
BH CV ECHO MEAS - AVA(V,D): 3.2 CM^2
BH CV ECHO MEAS - BSA(HAYCOCK): 3.4 M^2
BH CV ECHO MEAS - BSA: 3.1 M^2
BH CV ECHO MEAS - BZI_BMI: 66.7 KILOGRAMS/M^2
BH CV ECHO MEAS - BZI_METRIC_HEIGHT: 180.3 CM
BH CV ECHO MEAS - BZI_METRIC_WEIGHT: 216.8 KG
BH CV ECHO MEAS - EDV(MOD-SP2): 127 ML
BH CV ECHO MEAS - EDV(MOD-SP4): 126 ML
BH CV ECHO MEAS - EDV(TEICH): 67.7 ML
BH CV ECHO MEAS - EF(CUBED): 36.8 %
BH CV ECHO MEAS - EF(MOD-BP): 57.2 %
BH CV ECHO MEAS - EF(MOD-SP2): 60.6 %
BH CV ECHO MEAS - EF(MOD-SP4): 58.7 %
BH CV ECHO MEAS - EF(TEICH): 30.7 %
BH CV ECHO MEAS - ESV(MOD-SP2): 50 ML
BH CV ECHO MEAS - ESV(MOD-SP4): 52 ML
BH CV ECHO MEAS - ESV(TEICH): 46.9 ML
BH CV ECHO MEAS - FS: 14.2 %
BH CV ECHO MEAS - IVS/LVPW: 0.98
BH CV ECHO MEAS - IVSD: 1.5 CM
BH CV ECHO MEAS - LAT PEAK E' VEL: 13.4 CM/SEC
BH CV ECHO MEAS - LV DIASTOLIC VOL/BSA (35-75): 41.3 ML/M^2
BH CV ECHO MEAS - LV MASS(C)D: 232.4 GRAMS
BH CV ECHO MEAS - LV MASS(C)DI: 76.1 GRAMS/M^2
BH CV ECHO MEAS - LV MAX PG: 2.1 MMHG
BH CV ECHO MEAS - LV MEAN PG: 1 MMHG
BH CV ECHO MEAS - LV SYSTOLIC VOL/BSA (12-30): 17 ML/M^2
BH CV ECHO MEAS - LV V1 MAX: 71.8 CM/SEC
BH CV ECHO MEAS - LV V1 MEAN: 44.7 CM/SEC
BH CV ECHO MEAS - LV V1 VTI: 13.4 CM
BH CV ECHO MEAS - LVIDD: 3.9 CM
BH CV ECHO MEAS - LVIDS: 3.4 CM
BH CV ECHO MEAS - LVLD AP2: 8.6 CM
BH CV ECHO MEAS - LVLD AP4: 8.5 CM
BH CV ECHO MEAS - LVLS AP2: 6.8 CM
BH CV ECHO MEAS - LVLS AP4: 7.6 CM
BH CV ECHO MEAS - LVOT AREA (M): 4.5 CM^2
BH CV ECHO MEAS - LVOT AREA: 4.4 CM^2
BH CV ECHO MEAS - LVOT DIAM: 2.4 CM
BH CV ECHO MEAS - LVPWD: 1.5 CM
BH CV ECHO MEAS - MED PEAK E' VEL: 17.6 CM/SEC
BH CV ECHO MEAS - MV A MAX VEL: 102.4 CM/SEC
BH CV ECHO MEAS - MV DEC SLOPE: 473.9 CM/SEC^2
BH CV ECHO MEAS - MV DEC TIME: 0.19 SEC
BH CV ECHO MEAS - MV E MAX VEL: 86.5 CM/SEC
BH CV ECHO MEAS - MV E/A: 0.84
BH CV ECHO MEAS - MV MAX PG: 5.6 MMHG
BH CV ECHO MEAS - MV MEAN PG: 3 MMHG
BH CV ECHO MEAS - MV P1/2T MAX VEL: 83.1 CM/SEC
BH CV ECHO MEAS - MV P1/2T: 51.4 MSEC
BH CV ECHO MEAS - MV V2 MAX: 118.1 CM/SEC
BH CV ECHO MEAS - MV V2 MEAN: 83.1 CM/SEC
BH CV ECHO MEAS - MV V2 VTI: 18.7 CM
BH CV ECHO MEAS - MVA P1/2T LCG: 2.6 CM^2
BH CV ECHO MEAS - MVA(P1/2T): 4.3 CM^2
BH CV ECHO MEAS - MVA(VTI): 3.2 CM^2
BH CV ECHO MEAS - PA MAX PG (FULL): 1.2 MMHG
BH CV ECHO MEAS - PA MAX PG: 3.4 MMHG
BH CV ECHO MEAS - PA V2 MAX: 92.3 CM/SEC
BH CV ECHO MEAS - PULM A REVS VEL: 24.8 CM/SEC
BH CV ECHO MEAS - PULM DIAS VEL: 35.3 CM/SEC
BH CV ECHO MEAS - PULM S/D: 1.2
BH CV ECHO MEAS - PULM SYS VEL: 41.8 CM/SEC
BH CV ECHO MEAS - PVA(V,A): 4.5 CM^2
BH CV ECHO MEAS - PVA(V,D): 4.5 CM^2
BH CV ECHO MEAS - QP/QS: 1.6
BH CV ECHO MEAS - RV MAX PG: 2.2 MMHG
BH CV ECHO MEAS - RV MEAN PG: 1.2 MMHG
BH CV ECHO MEAS - RV V1 MAX: 74.1 CM/SEC
BH CV ECHO MEAS - RV V1 MEAN: 51.6 CM/SEC
BH CV ECHO MEAS - RV V1 VTI: 16.7 CM
BH CV ECHO MEAS - RVOT AREA: 5.6 CM^2
BH CV ECHO MEAS - RVOT DIAM: 2.7 CM
BH CV ECHO MEAS - SI(CUBED): 7.4 ML/M^2
BH CV ECHO MEAS - SI(LVOT): 19.3 ML/M^2
BH CV ECHO MEAS - SI(MOD-SP2): 25.2 ML/M^2
BH CV ECHO MEAS - SI(MOD-SP4): 24.2 ML/M^2
BH CV ECHO MEAS - SI(TEICH): 6.8 ML/M^2
BH CV ECHO MEAS - SV(CUBED): 22.6 ML
BH CV ECHO MEAS - SV(LVOT): 59 ML
BH CV ECHO MEAS - SV(MOD-SP2): 77 ML
BH CV ECHO MEAS - SV(MOD-SP4): 74 ML
BH CV ECHO MEAS - SV(RVOT): 93 ML
BH CV ECHO MEAS - SV(TEICH): 20.8 ML
BH CV ECHO MEAS - TAPSE (>1.6): 2.8 CM
BH CV ECHO MEASUREMENTS AVERAGE E/E' RATIO: 5.58
BH CV XLRA - TDI S': 10.7 CM/SEC
CREAT BLDA-MCNC: 0.8 MG/DL (ref 0.6–1.3)
LEFT ATRIUM VOLUME INDEX: 24 ML/M2
MAXIMAL PREDICTED HEART RATE: 171 BPM
SINUS: 3.6 CM
STJ: 3.3 CM
STRESS TARGET HR: 145 BPM

## 2020-08-20 PROCEDURE — 25010000002 IOPAMIDOL 61 % SOLUTION: Performed by: OTOLARYNGOLOGY

## 2020-08-20 PROCEDURE — 93306 TTE W/DOPPLER COMPLETE: CPT

## 2020-08-20 PROCEDURE — 82565 ASSAY OF CREATININE: CPT

## 2020-08-20 PROCEDURE — 93306 TTE W/DOPPLER COMPLETE: CPT | Performed by: INTERNAL MEDICINE

## 2020-08-20 PROCEDURE — 70491 CT SOFT TISSUE NECK W/DYE: CPT

## 2020-08-20 PROCEDURE — 25010000002 PERFLUTREN (DEFINITY) 8.476 MG IN SODIUM CHLORIDE 0.9 % 10 ML INJECTION: Performed by: INTERNAL MEDICINE

## 2020-08-20 RX ADMIN — PERFLUTREN 2 ML: 6.52 INJECTION, SUSPENSION INTRAVENOUS at 15:59

## 2020-08-20 RX ADMIN — IOPAMIDOL 85 ML: 612 INJECTION, SOLUTION INTRAVENOUS at 14:58

## 2020-08-21 ENCOUNTER — TELEPHONE (OUTPATIENT)
Dept: FAMILY MEDICINE CLINIC | Facility: CLINIC | Age: 50
End: 2020-08-21

## 2020-08-21 ENCOUNTER — DOCUMENTATION (OUTPATIENT)
Dept: FAMILY MEDICINE CLINIC | Facility: CLINIC | Age: 50
End: 2020-08-21

## 2020-08-21 DIAGNOSIS — E04.9 GOITER: Primary | ICD-10-CM

## 2020-08-21 DIAGNOSIS — E11.9 TYPE 2 DIABETES MELLITUS WITHOUT COMPLICATION, WITHOUT LONG-TERM CURRENT USE OF INSULIN (HCC): ICD-10-CM

## 2020-08-21 NOTE — TELEPHONE ENCOUNTER
Upon reviewing patient's chart, I confirmed that both of those exams were ordered by another provider. I spoke to patient and he explained to be that he has spoken to you last week and you had told him that once those results were in you would want to speak with him and go over them.  Please advise

## 2020-08-21 NOTE — PROGRESS NOTES
Phone call with patient.  The preliminary CT report reveals no abnormal mass or lymphadenopathy.  There is a very large goiter causing some mild compression of the trachea.  He has an appointment coming up with the ENT surgeon Dr. No this Tuesday.  They are going to discuss a probable biopsy of the tongue lesion which still bothers him.  Patient is aware of the diagnosis of a large goiter.  He is overdue for his blood work.  He is going to get it done next week fasting.  I have added thyroid studies.

## 2020-08-21 NOTE — TELEPHONE ENCOUNTER
Patient called and requested a call back regarding CT and echo results     Please advise     [phone]

## 2020-08-26 ENCOUNTER — TELEPHONE (OUTPATIENT)
Dept: FAMILY MEDICINE CLINIC | Facility: CLINIC | Age: 50
End: 2020-08-26

## 2020-08-26 NOTE — TELEPHONE ENCOUNTER
Please call patient and see what is going on.  Did he moved to [place]?  Dr. No has been following him closely.  I do not know any ENT doctors in [place].

## 2020-08-26 NOTE — TELEPHONE ENCOUNTER
I spoke to patient and he is moving to [place] the first os September or so.  Dr. No's office recommended an ENT in [place] and he is going to call them to see if he needs a new referral placed.  He will let us know if a new one is needed.  He will still be a patient of this office for time being.

## 2020-08-27 ENCOUNTER — TELEPHONE (OUTPATIENT)
Dept: FAMILY MEDICINE CLINIC | Facility: CLINIC | Age: 50
End: 2020-08-27

## 2020-08-27 DIAGNOSIS — K14.0 TONGUE ULCER: Primary | ICD-10-CM

## 2020-08-27 NOTE — TELEPHONE ENCOUNTER
Patient called and is moving from to [place] to [place]. He has a referral for ENT in [place] and was advised to get a name from that provider for one in [place]. The [place] [facility] name is:  Joseph Valentino MD-[phone]  Needs a new referral to this office.    Best call back for pt [phone]

## 2020-09-03 ENCOUNTER — TELEPHONE (OUTPATIENT)
Dept: FAMILY MEDICINE CLINIC | Facility: CLINIC | Age: 50
End: 2020-09-03

## 2020-09-03 NOTE — TELEPHONE ENCOUNTER
Tried to contact cancer center to get lab results. Could not reach anyone. Will try to contact later.

## 2020-09-03 NOTE — TELEPHONE ENCOUNTER
PATIENT HAD AN APPT YESTERDAY IN [place] WITH DR JOSEPH VALENTINO AT [facility]. LABS WERE DRAWN. PT IS ASKING FOR SOMEONE TO CALL AND REQUEST LAB RESULTS SO HE CAN SEE THEM IN [facility].  THEIR NUMBER [phone]

## 2020-09-04 DIAGNOSIS — I10 ESSENTIAL HYPERTENSION: Primary | ICD-10-CM

## 2020-09-04 RX ORDER — ESOMEPRAZOLE MAGNESIUM 40 MG/1
CAPSULE, DELAYED RELEASE ORAL
Qty: 90 CAPSULE | Refills: 1 | Status: SHIPPED | OUTPATIENT
Start: 2020-09-04 | End: 2020-11-11

## 2020-09-09 ENCOUNTER — TELEPHONE (OUTPATIENT)
Dept: FAMILY MEDICINE CLINIC | Facility: CLINIC | Age: 50
End: 2020-09-09

## 2020-09-09 NOTE — TELEPHONE ENCOUNTER
I reached to the pt. Unable to LM, mailbox is full. However, we did receive lab results from [facility]. MO

## 2020-09-09 NOTE — TELEPHONE ENCOUNTER
PATIENT REQUESTS A MY CHART APPOINTMENT IN October 2020.  HUB UNABLE TO SCHEDULE.      PATIENT ALSO INQUIRED ON LAB RESULTS ON 9/2/20  FROM [facility] [place], [place].   PHONE: [phone]    PLEASE ADVISE    PATIENT CALL BACK #: [phone]

## 2020-09-11 RX ORDER — FENOFIBRATE 160 MG/1
TABLET ORAL
Qty: 90 TABLET | Refills: 1 | Status: SHIPPED | OUTPATIENT
Start: 2020-09-11 | End: 2020-10-27 | Stop reason: SDUPTHER

## 2020-10-23 ENCOUNTER — READMISSION MANAGEMENT (OUTPATIENT)
Dept: CALL CENTER | Facility: HOSPITAL | Age: 50
End: 2020-10-23

## 2020-10-23 ENCOUNTER — TELEPHONE (OUTPATIENT)
Dept: FAMILY MEDICINE CLINIC | Facility: CLINIC | Age: 50
End: 2020-10-23

## 2020-10-23 ENCOUNTER — TRANSITIONAL CARE MANAGEMENT TELEPHONE ENCOUNTER (OUTPATIENT)
Dept: CALL CENTER | Facility: HOSPITAL | Age: 50
End: 2020-10-23

## 2020-10-23 NOTE — OUTREACH NOTE
Prep Survey      Responses   [facility] facility patient discharged from?  Non-BH   Is LACE score < 7 ?  Non-BH Discharge   Elig[facility]  [facility]   Date of Admission  10/08/20   Date of Discharge  10/22/20   Does the patient have one of the following disease processes/diagnoses(primary or secondary)?  Other   Prep survey completed?  Yes          Essie Ibrahim RN

## 2020-10-23 NOTE — OUTREACH NOTE
Call Center TCM Note      Responses   [facility] patient discharged from?  Non-   Does the patient have one of the following disease processes/diagnoses(primary or secondary)?  Other   TCM attempt successful?  Yes   Call start time  [phone]   Call end time  [phone]   Meds reviewed with patient/caregiver?  Yes   Is the patient having any side effects they believe may be caused by any medication additions or changes?  No   Does the patient have all medications ordered at discharge?  Yes   Is the patient taking all medications as directed (includes completed medication regime)?  Yes   Does the patient have a primary care provider?   Yes   Does the patient have an appointment with their PCP within 7 days of discharge?  No   What is preventing the patient from scheduling follow up appointments within 7 days of discharge?  -- [There was no video visit with PCP for hospital d/c until 11/24/20]   Nursing Interventions  Advised patient to make appointment, Educated patient on importance of making appointment [Routed to pcp office]   Has the patient kept scheduled appointments due by today?  N/A   What is the Home health agency?   Home health was ordered   Has home health visited the patient within 72 hours of discharge?  Call prior to 72 hours   Home health comments  HH should visit on 10/23/20   What DME was ordered?  Home O2, BiPAP, walker, wheelchair   Has all DME been delivered?  Yes   DME comments  Pt is wearing 2 L O2 continuous and a BPAP at night   Psychosocial issues?  No   Did the patient receive a copy of their discharge instructions?  Yes   Nursing interventions  Reviewed instructions with patient   What is the patient's perception of their health status since discharge?  Improving   Is the patient/caregiver able to teach back signs and symptoms related to disease process for when to call PCP?  Yes   Is the patient/caregiver able to teach back signs and symptoms related to disease process for when to call 911?   "Yes   Is the patient/caregiver able to teach back the hierarchy of who to call/visit for symptoms/problems? PCP, Specialist, Home health nurse, Urgent Care, ED, 911  Yes   If the patient is a current smoker, are they able to teach back resources for cessation?  Not a smoker   TCM call completed?  Yes   Wrap up additional comments  Pt reported, \"thank you so much. You put me at ease.\"          Melvina Daniel RN    10/23/2020, 10:08 EDT      "

## 2020-10-23 NOTE — TELEPHONE ENCOUNTER
PT RETURNED A CALL HE MISSED [employer] TO SCHEDULE A MYCHART VIDEO VISIT FOR A SURGICAL FOLLOW-UP. PLEASE CALL BACK ASAP TO SCHEDULE. HE NEEDS TO BE SEEN ASAP, DR. LOVE DOES NOT HAVE A MYCHART OPEN IN THE NEAR FUTURE, PLEASE CALL BACK TO SCHEDULE.     PT ALSO STATES HE HAS A HOME HEALTH NURSE WHO CAN DRAW BLOOD IF DR. LOVE CALLS NURSE ON-CALL HOME HEALTH CARE: [phone] WITH ORDERS.     PT CAN BE REACHED [phone]

## 2020-10-26 DIAGNOSIS — E04.9 GOITER: ICD-10-CM

## 2020-10-26 DIAGNOSIS — E11.9 TYPE 2 DIABETES MELLITUS WITHOUT COMPLICATION, WITHOUT LONG-TERM CURRENT USE OF INSULIN (HCC): ICD-10-CM

## 2020-10-27 ENCOUNTER — TELEMEDICINE (OUTPATIENT)
Dept: FAMILY MEDICINE CLINIC | Facility: CLINIC | Age: 50
End: 2020-10-27

## 2020-10-27 DIAGNOSIS — G47.33 OBSTRUCTIVE SLEEP APNEA: ICD-10-CM

## 2020-10-27 DIAGNOSIS — E11.9 TYPE 2 DIABETES MELLITUS WITHOUT COMPLICATION, WITHOUT LONG-TERM CURRENT USE OF INSULIN (HCC): ICD-10-CM

## 2020-10-27 DIAGNOSIS — Z09 HOSPITAL DISCHARGE FOLLOW-UP: Primary | ICD-10-CM

## 2020-10-27 DIAGNOSIS — E66.01 MORBID OBESITY DUE TO EXCESS CALORIES (HCC): ICD-10-CM

## 2020-10-27 DIAGNOSIS — F25.9 SCHIZOAFFECTIVE DISORDER, UNSPECIFIED TYPE (HCC): ICD-10-CM

## 2020-10-27 DIAGNOSIS — E78.2 MIXED HYPERLIPIDEMIA: ICD-10-CM

## 2020-10-27 DIAGNOSIS — I10 ESSENTIAL HYPERTENSION: ICD-10-CM

## 2020-10-27 PROCEDURE — 99214 OFFICE O/P EST MOD 30 MIN: CPT | Performed by: FAMILY MEDICINE

## 2020-10-27 RX ORDER — CARVEDILOL 12.5 MG/1
TABLET ORAL
Qty: 60 TABLET | Refills: 2 | Status: SHIPPED | OUTPATIENT
Start: 2020-10-27

## 2020-10-27 RX ORDER — FUROSEMIDE 40 MG/1
40 TABLET ORAL DAILY
Qty: 30 TABLET | Refills: 2 | Status: SHIPPED | OUTPATIENT
Start: 2020-10-27 | End: 2020-11-13 | Stop reason: SDUPTHER

## 2020-10-27 RX ORDER — GLIMEPIRIDE 2 MG/1
2 TABLET ORAL
Qty: 30 TABLET | Refills: 2 | Status: SHIPPED | OUTPATIENT
Start: 2020-10-27

## 2020-10-27 RX ORDER — LOSARTAN POTASSIUM 50 MG/1
50 TABLET ORAL 2 TIMES DAILY
Qty: 60 TABLET | Refills: 2 | Status: SHIPPED | OUTPATIENT
Start: 2020-10-27

## 2020-10-27 RX ORDER — SIMVASTATIN 40 MG
40 TABLET ORAL
Qty: 30 TABLET | Refills: 2 | Status: SHIPPED | OUTPATIENT
Start: 2020-10-27 | End: 2020-12-21

## 2020-10-27 RX ORDER — RISPERIDONE 1 MG/1
TABLET ORAL
Qty: 120 TABLET | Refills: 2 | Status: SHIPPED | OUTPATIENT
Start: 2020-10-27

## 2020-10-27 RX ORDER — OMEPRAZOLE 40 MG/1
CAPSULE, DELAYED RELEASE ORAL
Qty: 30 CAPSULE | Refills: 2 | Status: SHIPPED | OUTPATIENT
Start: 2020-10-27

## 2020-10-27 RX ORDER — FENOFIBRATE 160 MG/1
160 TABLET ORAL DAILY
Qty: 30 TABLET | Refills: 2 | Status: SHIPPED | OUTPATIENT
Start: 2020-10-27

## 2020-10-27 NOTE — PROGRESS NOTES
Subjective   Kade Pereira is a 50 y.o. male.     Chief Complaint   Patient presents with   • Diabetes        History of Present Illness    Coronavirus pandemic telehealth visit.  Good audio and video connection.  Patient gave informed consent through the [employer] check in process.    Follow-up hospitalization.  He had a skin lesion removed off of his tongue.  It turned out reportedly to be benign.  I do not have the hospital records.  Patient states he went into respiratory failure afterwards.  He was in the ICU for a number of days.  Reportedly had acute kidney injury.  He was then in rehab for a few days.  He is now home.  He requires home oxygen.  His O2 company is trying to get us information we have yet to see it.  Reportedly fax this morning.  We are currently investigating.    Diabetes type 2.  He continues glimepiride.  Last A1c was 6.9% less than a year ago.  He checks his blood sugar at home occasionally.  He has trouble affording the test strips.    Hypertension.  They change his medication around at the hospital.  He started taking hydralazine 25 mg 3 times a day, potassium supplementation 10 mEq 2 tablets daily, losartan 50 mg twice a day, carvedilol 12.5 mg twice a day.  He is also taking furosemide 40 mg daily.  He is no longer taking the bisoprolol hydrochlorothiazide.    Mood disorder.  He continues on risperidone previously prescribed by his psychiatrist.  He is on a lower dose now.  2 mg twice a day.    Hyperlipidemia.  Mixed.  He continues fenofibrate 160 mg a day and simvastatin 40 mg a day.      The following portions of the patient's history were reviewed and updated as appropriate: allergies, current medications, past family history, past medical history, past social history, past surgical history and problem list.          Review of Systems   Constitutional: Negative.    Respiratory: Positive for shortness of breath.    Cardiovascular: Positive for leg swelling.   Neurological: Negative.     Psychiatric/Behavioral: Negative.        Objective   There were no vitals taken for this visit.  Physical Exam  Constitutional:       General: He is not in acute distress.  HENT:      Head: Atraumatic.   Pulmonary:      Effort: Pulmonary effort is normal. No respiratory distress.      Comments: Currently using home O2.  Nasal cannula.  Skin:     Coloration: Skin is not pale.   Neurological:      Mental Status: He is alert and oriented to person, place, and time.      Coordination: Coordination normal.   Psychiatric:         Behavior: Behavior normal.     BMI recorded at 60 last office visit June 23 2020    Assessment/Plan   Diagnoses and all orders for this visit:    1. Hospital discharge follow-up (Primary)    2. Type 2 diabetes mellitus without complication, without long-term current use of insulin (CMS/[facility])    3. Morbid obesity due to excess calories (CMS/[facility])    4. Obstructive sleep apnea    5. Essential hypertension    6. Mixed hyperlipidemia    7. Schizoaffective disorder, unspecified type (CMS/[facility])      Hospital discharge follow-up.    Diabetes type 2.  Going to check labs coming up occluding A1c with a home health nurse.  Continue glimepiride.    Morbid obesity.  Obstructive sleep apnea.  Postop respiratory failure.  All likely related.  He continues on oxygen at home.  He states his O2 sats are running good at home on oxygen.  He does not appear to be in respiratory distress.    Hypertension.  There were some changes to his medications, see above.    Hyperlipidemia.  Continue fenofibrate and simvastatin.    Schizoaffective disorder.  He continues risperidone.    Patient has moved to [place].  He is now living in [place].  He is going to find a primary care provider within 3 months.  I am giving him some refills to hold him over till then.  We will see him back via telehealth MyChart visit in 6 weeks.    Duration of visit 25 minutes.

## 2020-10-28 DIAGNOSIS — I10 ESSENTIAL HYPERTENSION: ICD-10-CM

## 2020-10-28 DIAGNOSIS — E11.9 TYPE 2 DIABETES MELLITUS WITHOUT COMPLICATION, WITHOUT LONG-TERM CURRENT USE OF INSULIN (HCC): ICD-10-CM

## 2020-10-28 DIAGNOSIS — E78.2 MIXED HYPERLIPIDEMIA: Primary | ICD-10-CM

## 2020-10-28 DIAGNOSIS — E04.9 GOITER: ICD-10-CM

## 2020-10-28 RX ORDER — FLASH GLUCOSE SCANNING READER
1 EACH MISCELLANEOUS 2 TIMES DAILY
Qty: 2 DEVICE | Refills: 2 | Status: SHIPPED | OUTPATIENT
Start: 2020-10-28

## 2020-10-28 RX ORDER — FLASH GLUCOSE SENSOR
1 KIT MISCELLANEOUS 2 TIMES DAILY
Qty: 2 EACH | Refills: 2 | Status: SHIPPED | OUTPATIENT
Start: 2020-10-28

## 2020-10-30 LAB
ALBUMIN SERPL-MCNC: 3.8 G/DL (ref 4–5)
ALBUMIN/GLOB SERPL: 1.5 {RATIO} (ref 1.2–2.2)
ALP SERPL-CCNC: 85 IU/L (ref 39–117)
ALT SERPL-CCNC: 21 IU/L (ref 0–44)
AST SERPL-CCNC: 17 IU/L (ref 0–40)
BILIRUB SERPL-MCNC: 0.4 MG/DL (ref 0–1.2)
BUN SERPL-MCNC: 14 MG/DL (ref 6–24)
BUN/CREAT SERPL: 20 (ref 9–20)
CALCIUM SERPL-MCNC: 9.3 MG/DL (ref 8.7–10.2)
CHLORIDE SERPL-SCNC: 103 MMOL/L (ref 96–106)
CHOLEST SERPL-MCNC: 82 MG/DL (ref 100–199)
CO2 SERPL-SCNC: 28 MMOL/L (ref 20–29)
CREAT SERPL-MCNC: 0.71 MG/DL (ref 0.76–1.27)
GLOBULIN SER CALC-MCNC: 2.5 G/DL (ref 1.5–4.5)
GLUCOSE SERPL-MCNC: 119 MG/DL (ref 65–99)
HBA1C MFR BLD: 6.6 % (ref 4.8–5.6)
HDLC SERPL-MCNC: 21 MG/DL
LDLC SERPL CALC-MCNC: 35 MG/DL (ref 0–99)
POTASSIUM SERPL-SCNC: 4 MMOL/L (ref 3.5–5.2)
PROT SERPL-MCNC: 6.3 G/DL (ref 6–8.5)
SODIUM SERPL-SCNC: 144 MMOL/L (ref 134–144)
TRIGL SERPL-MCNC: 154 MG/DL (ref 0–149)
TSH SERPL DL<=0.005 MIU/L-ACNC: 1.44 UIU/ML (ref 0.45–4.5)
VLDLC SERPL CALC-MCNC: 26 MG/DL (ref 5–40)

## 2020-10-30 NOTE — PROGRESS NOTES
The A1c average blood sugar test is much improved.  Cholesterol improved.  Thyroid normal level.  Kidney function excellent.

## 2020-11-03 DIAGNOSIS — G47.33 OBSTRUCTIVE SLEEP APNEA: Primary | ICD-10-CM

## 2020-11-09 RX ORDER — BISOPROLOL FUMARATE AND HYDROCHLOROTHIAZIDE 10; 6.25 MG/1; MG/1
TABLET ORAL
Qty: 90 TABLET | Refills: 1 | OUTPATIENT
Start: 2020-11-09

## 2020-11-11 ENCOUNTER — TELEMEDICINE (OUTPATIENT)
Dept: CARDIOLOGY | Facility: CLINIC | Age: 50
End: 2020-11-11

## 2020-11-11 DIAGNOSIS — I89.0 LYMPHEDEMA: Primary | ICD-10-CM

## 2020-11-11 DIAGNOSIS — E66.01 MORBID OBESITY DUE TO EXCESS CALORIES (HCC): ICD-10-CM

## 2020-11-11 DIAGNOSIS — I10 ESSENTIAL HYPERTENSION: ICD-10-CM

## 2020-11-11 DIAGNOSIS — I87.2 VENOUS STASIS DERMATITIS OF LEFT LOWER EXTREMITY: ICD-10-CM

## 2020-11-11 DIAGNOSIS — E78.2 MIXED HYPERLIPIDEMIA: ICD-10-CM

## 2020-11-11 DIAGNOSIS — G47.33 OBSTRUCTIVE SLEEP APNEA: ICD-10-CM

## 2020-11-11 PROCEDURE — 99443 PR PHYS/QHP TELEPHONE EVALUATION 21-30 MIN: CPT | Performed by: INTERNAL MEDICINE

## 2020-11-11 RX ORDER — HYDRALAZINE HYDROCHLORIDE 25 MG/1
25 TABLET, FILM COATED ORAL 3 TIMES DAILY
COMMUNITY
End: 2020-11-13 | Stop reason: SDUPTHER

## 2020-11-11 NOTE — PROGRESS NOTES
[facility] Telemedicine Follow Up Office Note     Encounter Date:20  Patient:Kade Pereira  :1970  MRN:[id number]      Chief Complaint:  Follow up leg swelling  Chief Complaint   Patient presents with   • Hypertension     3 month f/u   • Hyperlipidemia     History of Presenting Illness:      Mr. Pereira is a 50 y.o. gentleman with past medical history notable for morbid obesity, diabetes type 2 on oral therapy, hypertension, mixed hyperlipidemia, obstructive sleep apnea, lymphedema, and traumatic brain injury who presents to our office for scheduled follow up.  He was last seen approximately 3 months ago for a preoperative evaluation prior to consideration of tongue biopsy.  He was noted to have significant lymphedema and given his complex comorbidities it is advised that he see a cardiologist.  From a symptom standpoint he was having some dyspnea on exertion with given his morbid obesity obviously it was hard to tease out from any cardiac issue versus his other comorbidities.  We did obtain an echocardiogram which demonstrated preserved left ventricular function but obviously his echo windows were somewhat limited given his body habitus.  We did advise that he could proceed with a tongue biopsy however if he were to need any sort of more radical surgery that further stratification with stress testing would be advisable.    Since that time he did moved to [place] and a lot has happened since then.  He did get established with a ENT physician in [place] with [facility] and did undergo his tongue biopsy.  Unfortunately his post procedure course was complicated by respiratory failure requiring prolonged use of CPAP.  He spent approximately 2 weeks in the hospital there slowly recovering from his respiratory failure.  He was eventually discharged to a rehab facility for another 2 weeks and has now returned home.  The one good thing about his hospitalization was that he was seen  by a lymphedema specialist in the hospital who did start wrapping his legs and has had significant improvement in his overall leg swelling.  He has lost almost 20 pounds mainly water weight from his legs.    Clearly after this prolonged hospitalization he continues to have some dyspnea on exertion and shortness of breath.  He has generalized fatigue.  His leg swelling is improved.  In general seems be doing well.  He does have an appointment in 2 weeks with a new primary care physician and hoping to get a referral with the lymphedema clinic locally.      Review of Systems:  Review of Systems   Constitution: Positive for malaise/fatigue.   HENT: Positive for ear pain and sore throat.    Eyes: Negative.    Cardiovascular: Positive for claudication and leg swelling.   Respiratory: Positive for shortness of breath and snoring.    Endocrine: Negative.    Skin: Negative.    Musculoskeletal: Negative.    Gastrointestinal: Negative.    Genitourinary: Positive for decreased libido.   Neurological: Positive for excessive daytime sleepiness, headaches and numbness.   Psychiatric/Behavioral: The patient is nervous/anxious.    Allergic/Immunologic: Negative.        Current Outpatient Medications on File Prior to Visit   Medication Sig Dispense Refill   • carvedilol (Coreg) 12.5 MG tablet Take 1 tablet oral two times per day. 60 tablet 2   • Continuous Blood Gluc  (FreeStyle Ilia 14 Day [place]) device 1 Device 2 (Two) Times a Day. 2 Device 2   • Continuous Blood Gluc Sensor (FreeStyle Ilia 14 Day Sensor) misc 1 Device 2 (Two) Times a Day. 2 each 2   • fenofibrate 160 MG tablet Take 1 tablet by mouth Daily. 30 tablet 2   • FLUoxetine (PROzac) 20 MG capsule TK ONE C PO  QAM  1   • furosemide (Lasix) 40 MG tablet Take 1 tablet by mouth Daily. 30 tablet 2   • glimepiride (AMARYL) 2 MG tablet Take 1 tablet by mouth Every Morning Before Breakfast. 30 tablet 2   • hydrALAZINE (APRESOLINE) 25 MG tablet Take 25 mg by mouth 3  (Three) Times a Day.     • losartan (COZAAR) 50 MG tablet Take 1 tablet by mouth 2 (Two) Times a Day. 60 tablet 2   • omeprazole (priLOSEC) 40 MG capsule Take 1 capsule oral once a day in the morning. 30 capsule 2   • risperiDONE (risperDAL) 1 MG tablet Take 2 tablets oral two times per day. 120 tablet 2   • simvastatin (ZOCOR) 40 MG tablet Take 1 tablet by mouth every night at bedtime. 30 tablet 2   • triamcinolone (KENALOG) 0.1 % paste Apply to mouth sore twice a day as needed 5 g 0   • triamcinolone (KENALOG) 0.5 % cream Apply  topically to the appropriate area as directed 3 (Three) Times a Day. 15 g 1   • [DISCONTINUED] bisoprolol-hydrochlorothiazide (ZIAC) 10-6.25 MG per tablet Take 1 tablet by mouth Daily.     • [DISCONTINUED] clotrimazole (LOTRIMIN) 1 % cream Apply  topically to the appropriate area as directed 2 (Two) Times a Day. 30 g 0   • [DISCONTINUED] esomeprazole (nexIUM) 40 MG capsule TAKE 1 CAPSULE BY MOUTH EVERY DAY IN THE MORNING BEFORE BREAKFAST 90 capsule 1   • [DISCONTINUED] spironolactone (Aldactone) 25 MG tablet Take 1 tablet by mouth Daily. 30 tablet 2     No current facility-administered medications on file prior to visit.        Allergies   Allergen Reactions   • Ace Inhibitors Cough   • Lorabid [Loracarbef] Rash     rash       Past Medical History:   Diagnosis Date   • Agoraphobia    • Hyperlipidemia    • Hypertension    • Schizoaffective disorder (CMS/HCC)    • Traumatic brain injury with loss of consciousness (CMS/HCC)    • Type 2 diabetes mellitus without complication, without long-term current use of insulin (CMS/HCC)        Past Surgical History:   Procedure Laterality Date   • COLONOSCOPY     • HAND SURGERY     • WISDOM TOOTH EXTRACTION         Social History     Socioeconomic History   • Marital status:      Spouse name: Not on file   • Number of children: Not on file   • Years of education: Not on file   • Highest education level: Not on file   Tobacco Use   • Smoking  status: Never Smoker   • Smokeless tobacco: Never Used   • Tobacco comment: caffeine use    Substance and Sexual Activity   • Alcohol use: Yes     Comment: occasional   • Drug use: No       Family History   Problem Relation Age of Onset   • Thyroid disease Mother    • Hypertension Mother    • Hypertension Father        The following portions of the patient's history were reviewed and updated as appropriate: allergies, current medications, past family history, past medical history, past social history, past surgical history and problem list.       Objective:       There were no vitals filed for this visit.    Physical Exam:  Deferred due to telephone visit      Lab Results   Component Value Date    BUN 14 10/29/2020    CREATININE 0.71 (L) 10/29/2020    EGFRIFNONA 110 10/29/2020    EGFRIFAFRI 127 10/29/2020    BCR 20 10/29/2020    K 4.0 10/29/2020    CO2 28 10/29/2020    CALCIUM 9.3 10/29/2020    PROTENTOTREF 6.3 10/29/2020    ALBUMIN 3.8 (L) 10/29/2020    LABIL2 1.5 10/29/2020    AST 17 10/29/2020    ALT 21 10/29/2020       No results found for: WBC, HGB, HCT, MCV, PLT    No results found for: CKTOTAL, CKMB, CKMBINDEX, TROPONINI, TROPONINT    Lab Results   Component Value Date    CHLPL 82 (L) 10/29/2020    CHLPL 110 11/20/2019    CHLPL 129 04/16/2019     Lab Results   Component Value Date    TRIG 154 (H) 10/29/2020    TRIG 158 (H) 11/20/2019    TRIG 168 (H) 04/16/2019     Lab Results   Component Value Date    HDL 21 (L) 10/29/2020    HDL 27 (L) 11/20/2019    HDL 29 (L) 04/16/2019     Lab Results   Component Value Date    LDL 35 10/29/2020    LDL 51 11/20/2019    LDL 66 04/16/2019       Lab Results   Component Value Date    TSH 1.440 10/29/2020       Echocardiogram 8/20/2020:  · Left ventricular wall thickness is consistent with mild-to-moderate concentric hypertrophy.  · Left ventricular systolic function is normal.  · Calculated EF = 57.2%.    EKG 8/10/2020:  · Sinus rhythm with right axis deviation         Assessment:          Diagnosis Plan   1. Lymphedema     2. Venous stasis dermatitis of left lower extremity     3. Essential hypertension     4. Mixed hyperlipidemia     5. Obstructive sleep apnea     6. Morbid obesity due to excess calories (CMS/[facility])            Plan:       Mr. Pereira is a 50 y.o. gentleman with past medical history notable for morbid obesity, diabetes type 2 on oral therapy, hypertension, mixed hyperlipidemia, obstructive sleep apnea, lymphedema, and traumatic brain injury who presents for scheduled follow-up.  Fortunately despite having an extensive hospitalization the patient seems to be doing reasonably well and getting back on track.  From a cardiac standpoint I would not make any changes with his medical regimen at this time.  I would encourage him to get a referral to a lymphedema specialist locally.  He has plans to see a primary care physician in the coming weeks and hopefully they can direct him to the appropriate referral base.  If not we can look into that but likely his primary care physician might be a better resource.  From a cardiac standpoint again if his primary care physician feels that he needs to be seen by one that would be reasonable for referral but from my standpoint he may see us back on an as-needed basis.    Lymphedema:  · Extensive lymphedema noted and referral to lymphedema clinic was made locally but now that he is in [place] would recommend getting established with his primary care physician and obtain referral from him or her.  · Continue with current diuretic regimen  · Echocardiogram normal LV systolic function    Hypertension:  · Blood pressure reasonably controlled continue current medical therapy further titration by primary care    Mixed hyperlipidemia:  · Continue statin therapy    Obstructive sleep apnea:  · Continue CPAP use as directed by sleep medicine    Follow-up:  As needed    Thank you for allowing me to participate in the care of Kade REYEZ  Kelli. Feel free to contact me directly with any further questions or concerns.    Luther Zhang MD  [facility]  11/11/20  15:31 EST        This patient has consented to a telehealth visit via phone. The visit was scheduled as a phone visit to comply with patient safety concerns in accordance with CDC recommendations.  All vitals recorded within this visit are reported by the patient.  I spent  40 minutes in total including but not limited to the 32 minutes spent in direct conversation with this patient.

## 2020-11-13 RX ORDER — FUROSEMIDE 40 MG/1
40 TABLET ORAL DAILY
Qty: 30 TABLET | Refills: 2 | Status: SHIPPED | OUTPATIENT
Start: 2020-11-13

## 2020-11-13 RX ORDER — HYDRALAZINE HYDROCHLORIDE 25 MG/1
25 TABLET, FILM COATED ORAL 3 TIMES DAILY
Qty: 90 TABLET | Refills: 2 | Status: SHIPPED | OUTPATIENT
Start: 2020-11-13 | End: 2020-12-17

## 2020-11-13 NOTE — TELEPHONE ENCOUNTER
Caller: Kelli Kade S    Relationship: Self    Best call back number: [phone]    Medication needed:   Requested Prescriptions     Pending Prescriptions Disp Refills   • hydrALAZINE (APRESOLINE) 25 MG tablet        Sig: Take 1 tablet by mouth 3 (Three) Times a Day.   POTASSIUM CHLORIDE    furosemide (Lasix) 40 MG tablet       When do you need the refill by: 11/19    What details did the patient provide when requesting the medication: PT HAS A WEEK SUPPLY LEFT     Does the patient have less than a 3 day supply:  [] Yes  [x] No    What is the patient's preferred pharmacy: [id number] - [place], [place] - [address] [facility] [address] [facility] [phone] [facility] [phone] FX

## 2020-11-16 RX ORDER — POTASSIUM CHLORIDE 750 MG/1
TABLET, FILM COATED, EXTENDED RELEASE ORAL
Qty: 60 TABLET | Refills: 1 | Status: SHIPPED | OUTPATIENT
Start: 2020-11-16

## 2020-11-16 RX ORDER — POTASSIUM CHLORIDE 750 MG/1
10 TABLET, FILM COATED, EXTENDED RELEASE ORAL 2 TIMES DAILY
Qty: 60 TABLET | Refills: 5 | Status: SHIPPED | OUTPATIENT
Start: 2020-11-16

## 2020-11-16 NOTE — TELEPHONE ENCOUNTER
----- Message from Basil Covington MD sent at 11/16/2020  8:07 AM EST -----  Regarding: FW: Prescription Question  Contact: [phone]  I believe the Potassium was printed this morning. Not sure what happened. Perhaps we have to resend. He is in [place] now. RB  ----- Message -----  From: Kade Pereira  Sent: 11/15/2020   5:40 PM EST  To: [facility]  Subject: Prescription Question                            Refill for Lasix and Potassium were recently faxed in for refill approval.  Lasix was approved, but Potassium is still pending.  I need to take the Potassium when I take the Lasix pill, otherwise my level drops too low.  I have to take the Lasix to control the swelling in my legs.  Please check on the Potassium refill and if you agree, call into the pharmacy.  It is the same [facility] pharmacy in [place] that the Lasix refill was sent to at [phone].    Thanks much,  Yony

## 2020-12-08 ENCOUNTER — TELEMEDICINE (OUTPATIENT)
Dept: FAMILY MEDICINE CLINIC | Facility: CLINIC | Age: 50
End: 2020-12-08

## 2020-12-08 DIAGNOSIS — K14.0 TONGUE ULCER: ICD-10-CM

## 2020-12-08 DIAGNOSIS — R21 RASH/SKIN ERUPTION: ICD-10-CM

## 2020-12-08 DIAGNOSIS — E11.9 TYPE 2 DIABETES MELLITUS WITHOUT COMPLICATION, WITHOUT LONG-TERM CURRENT USE OF INSULIN (HCC): Primary | ICD-10-CM

## 2020-12-08 PROCEDURE — 99213 OFFICE O/P EST LOW 20 MIN: CPT | Performed by: FAMILY MEDICINE

## 2020-12-08 RX ORDER — TRIAMCINOLONE ACETONIDE 0.1 %
PASTE (GRAM) DENTAL
Qty: 5 G | Refills: 5 | Status: SHIPPED | OUTPATIENT
Start: 2020-12-08

## 2020-12-08 NOTE — PROGRESS NOTES
Subjective   Kade Pereira is a 50 y.o. male.     Chief Complaint   Patient presents with   • Diabetes        History of Present Illness    Coronavirus pandemic telehealth visit.  Good audio and video connection.  Patient gave informed consent through the [employer] check in process.    Follow-up diabetes type 2.  He does not tolerate Metformin, causes GI upset.  His hemoglobin A1c recently was 6.6.  Much improved.  He is taking glimepiride without issue.  No hypoglycemic events.  He is currently living in [place] and looking for a new primary care provider closer to home.    Oral ulcer.  On his tongue.  It led to a biopsy, because of concern of malignancy.  The biopsy was negative for cancer.  He had a prolonged hospitalization in the ICU because of respiratory failure related to his obesity and other complicating factors.  He has since been discharged doing well.  He is using his home oxygen.  He feels like he may have a small ulcer coming back up on his tongue.    Rash.  Left antecubital fossa.  His hands before.  He was treated previously with triamcinolone cream for atopic dermatitis.    The patient's wife had COVID-19 a number of weeks ago.  The patient was tested and it was negative, but he had fairly classic COVID-19 symptoms including URI symptoms, fatigue, feverish symptoms, and loss of sense of taste and smell.  He now feels much better.      The following portions of the patient's history were reviewed and updated as appropriate: allergies, current medications, past family history, past medical history, past social history, past surgical history and problem list.          Review of Systems   Constitutional: Negative.    Respiratory: Positive for shortness of breath ( Stable on home oxygen.).        Objective   There were no vitals taken for this visit.  Physical Exam  Constitutional:       General: He is not in acute distress.  HENT:      Head: Atraumatic.   Pulmonary:      Effort: Pulmonary effort is  normal. No respiratory distress.   Skin:     Coloration: Skin is not pale.      Comments: There is a eczematous rash on the left antecubital fossa.  Likely not cellulitis.   Neurological:      Mental Status: He is alert and oriented to person, place, and time.      Coordination: Coordination normal.   Psychiatric:         Behavior: Behavior normal.         Assessment/Plan   Diagnoses and all orders for this visit:    1. Type 2 diabetes mellitus without complication, without long-term current use of insulin (CMS/[facility]) (Primary)    2. Tongue ulcer    3. Rash/skin eruption    Other orders  -     triamcinolone (KENALOG) 0.1 % ointment; Apply  topically to the appropriate area as directed 2 (Two) Times a Day.  Dispense: 80 g; Refill: 0  -     triamcinolone (KENALOG) 0.1 % paste; Apply to mouth sore twice a day as needed  Dispense: 5 g; Refill: 5    Diabetes type 2.  Better control.  Continue glimepiride.  He will be following up with his new primary care physician in [facility].  Otherwise call with questions.    Tongue ulcer.  Possibly recurrent.  Given prescription for triamcinolone paste to use as indicated.  It is a dental paste designed for this.  With more severe symptoms patient understands to seek medical attention.    Rash on left antecubital fossa.  Probable atopic dermatitis.  Triamcinolone ointment prescribed.    Duration of visit approximately 15 minutes.

## 2020-12-10 ENCOUNTER — TELEPHONE (OUTPATIENT)
Dept: FAMILY MEDICINE CLINIC | Facility: CLINIC | Age: 50
End: 2020-12-10

## 2020-12-17 ENCOUNTER — OFFICE VISIT (OUTPATIENT)
Dept: FAMILY MEDICINE CLINIC | Facility: CLINIC | Age: 50
End: 2020-12-17

## 2020-12-17 DIAGNOSIS — I10 ESSENTIAL HYPERTENSION: Primary | ICD-10-CM

## 2020-12-17 PROCEDURE — 99442 PR PHYS/QHP TELEPHONE EVALUATION 11-20 MIN: CPT | Performed by: NURSE PRACTITIONER

## 2020-12-17 RX ORDER — HYDRALAZINE HYDROCHLORIDE 50 MG/1
50 TABLET, FILM COATED ORAL 3 TIMES DAILY
Qty: 90 TABLET | Refills: 0 | Status: SHIPPED | OUTPATIENT
Start: 2020-12-17 | End: 2020-12-19

## 2020-12-17 RX ORDER — HYDRALAZINE HYDROCHLORIDE 50 MG/1
TABLET, FILM COATED ORAL
Qty: 270 TABLET | OUTPATIENT
Start: 2020-12-17

## 2020-12-17 NOTE — PROGRESS NOTES
Subjective   Kade Pereira is a 50 y.o. male   who presents for   Chief Complaint   Patient presents with   • Hypertension     Occupational therapy coming for , using oxygen, home since surgery due to possible tongue surgery, went into respiratory failure and ICU x 7 days  On oxygen 24/7, bipap at night  Concerned with BP, has remained elevated past few times 160/90, 170/100 but consistently > 150/90s  States HH was concerned with bp, meds not controlling,   Changed BP meds while in rehab, at rehab, changed, apresoline 25 mg TID, losartan 50 mg BID, coreg 12.5 mg BID  Taking lasix 40 mg  Prior to hospitalization, he was taking losartan 100 mg daily, bisoprolol   Discharged from SNF 6 weeks        There were no vitals taken for this visit.      History of Present Illness   Hypertension  This is a chronic problem. The current episode started more than 1 year ago. The problem has been gradually worsening since onset. The problem is uncontrolled. Pertinent negatives include no anxiety, blurred vision, chest pain, headaches, malaise/fatigue, neck pain, orthopnea, palpitations, peripheral edema, PND, shortness of breath or sweats. There are no associated agents to hypertension. Risk factors for coronary artery disease include male gender, obesity and diabetes mellitus. Past treatments include direct vasodilators, beta blockers, angiotensin blockers and diuretics. Current antihypertension treatment includes diuretics, angiotensin blockers and beta blockers. The current treatment provides mild improvement. Compliance problems include diet and exercise.        The following portions of the patient's history were reviewed and updated as appropriate: allergies, current medications, past family history, past medical history, past social history, past surgical history and problem list.    Review of Systems  Review of Systems   Constitutional: Negative for malaise/fatigue.   Eyes: Negative for blurred vision.   Respiratory:  Negative for shortness of breath.    Cardiovascular: Negative for chest pain, palpitations, orthopnea and PND.   Musculoskeletal: Negative for neck pain.   Neurological: Negative for headaches.       Objective   Physical Exam  Physical Exam  Neurological:      Mental Status: He is alert and oriented to person, place, and time.           Assessment/Plan   Diagnoses and all orders for this visit:    1. Essential hypertension (Primary)    Other orders  -     hydrALAZINE (APRESOLINE) 50 MG tablet; Take 1 tablet by mouth 3 (Three) Times a Day.  Dispense: 90 tablet; Refill: 0         Increase hydralazine to 50 mg TID  Follow up with new PCP in 1 month, monitor BP with HH over next few weeks  Discussed potential side effects  If unable to get in with new pcp, follow up video/telephone visit  Agrees with recommendation, rx sent to pharmacy    You have chosen to receive care through a telephone visit. Do you consent to use a telephone visit for your medical care today? Yes  Time spent 15 minutes

## 2020-12-20 RX ORDER — SPIRONOLACTONE 25 MG/1
12.5 TABLET ORAL DAILY
Qty: 30 TABLET | Refills: 0 | Status: SHIPPED | OUTPATIENT
Start: 2020-12-20

## 2020-12-20 NOTE — PROGRESS NOTES
Pt has called after hours today x 2 for high BP.  He increased his dose of coreg to 25 mg as we discussed yesterday and his HR had dropped to low 60s this am and he felt fatigued,  He did not take his increased dose of coreg this am.  His BP is currently 177/107 and he is asymptomatic.      He has been on spironolactone in past, lynn well.  Not sure why it was stopped.      Has never been on amlodipine.      For today, will restart spironolactone at 12.5mg.  He will follow up with PCP tomorrow.      Discussed that his BP has been high for awhile and that medication will not lower his BP immediately most likely, but this is OK and not expected since he is asymptomatic.      If he becomes symptomatic, discussed sx, he would need to call 911 or go to ER.      Importance of PCP f/u tomorrow reinforced.

## 2020-12-21 ENCOUNTER — TELEMEDICINE (OUTPATIENT)
Dept: FAMILY MEDICINE CLINIC | Facility: CLINIC | Age: 50
End: 2020-12-21

## 2020-12-21 DIAGNOSIS — I10 ESSENTIAL HYPERTENSION: Primary | ICD-10-CM

## 2020-12-21 PROCEDURE — 99442 PR PHYS/QHP TELEPHONE EVALUATION 11-20 MIN: CPT | Performed by: FAMILY MEDICINE

## 2020-12-21 RX ORDER — ATORVASTATIN CALCIUM 20 MG/1
20 TABLET, FILM COATED ORAL DAILY
Qty: 90 TABLET | Refills: 1 | Status: SHIPPED | OUTPATIENT
Start: 2020-12-21

## 2020-12-21 RX ORDER — AMLODIPINE BESYLATE 10 MG/1
10 TABLET ORAL DAILY
Qty: 90 TABLET | Refills: 1 | Status: SHIPPED | OUTPATIENT
Start: 2020-12-21 | End: 2021-05-24

## 2020-12-21 RX ORDER — SPIRONOLACTONE 25 MG/1
TABLET ORAL
Qty: 45 TABLET | OUTPATIENT
Start: 2020-12-21

## 2020-12-21 NOTE — PROGRESS NOTES
Subjective   Kade Pereira is a 50 y.o. male.     No chief complaint on file.     Chief complaint hypertension.    History of Present Illness    You have chosen to receive care through a telephone visit. Do you consent to use a telephone visit for your medical care today? Yes    The patient was on hydralazine since his rehab stay.  He states he started having allergic reaction to it few days ago.  He felt like his tongue was swelling.  Hydralazine was stopped and then his blood pressure went even higher.  190/110 at one point.  He was told to restart his spironolactone but then he states he thought he had a reaction to that 2.  At this point he has no chest pain.  No chest pressure.  No trouble swallowing.  No shortness of breath.  He is currently living in [place].  He states he has a new primary care physician but they are not answering his phone calls.  He continues losartan 50 mg twice a day, carvedilol 12.5 mg twice a day and Lasix 40 mg daily.  In the past they tried to increase his carvedilol but his heart rate went too low.     The following portions of the patient's history were reviewed and updated as appropriate: allergies, current medications, past family history, past medical history, past social history, past surgical history and problem list.          Review of Systems   Constitutional: Negative.    HENT: Negative for trouble swallowing.    Respiratory: Negative.    Cardiovascular: Negative.    Neurological: Negative.        Objective   There were no vitals taken for this visit.  Physical Exam  Constitutional:       Comments: Patient is well-known to me.  He sounds in no acute distress.  His breathing appears normal.  His phonation appears normal.         Assessment/Plan   Diagnoses and all orders for this visit:    1. Essential hypertension (Primary)    Hypertension.  Needs better control.  Somewhat accelerated.     I want him to stop the simvastatin.  I want him to start amlodipine 10 mg daily.   Side effects discussed.  We are switching to atorvastatin 20 mg daily.  There is a potential interaction between amlodipine and simvastatin.  He will follow up with his primary care physician or back to us if he cannot get a hold of the [place] doctor.    He also understands go directly to the emergency room or call 911 with severe symptoms such as headache, shortness of breath, chest pain, or other severe symptoms.    Duration of visit 15 minutes

## 2021-03-08 ENCOUNTER — EPISODE CHANGES (OUTPATIENT)
Dept: CASE MANAGEMENT | Facility: OTHER | Age: 51
End: 2021-03-08

## 2021-04-02 ENCOUNTER — BULK ORDERING (OUTPATIENT)
Dept: CASE MANAGEMENT | Facility: OTHER | Age: 51
End: 2021-04-02

## 2021-04-02 DIAGNOSIS — Z23 IMMUNIZATION DUE: ICD-10-CM

## 2021-05-24 RX ORDER — AMLODIPINE BESYLATE 10 MG/1
10 TABLET ORAL DAILY
Qty: 90 TABLET | Refills: 0 | Status: SHIPPED | OUTPATIENT
Start: 2021-05-24

## 2021-06-10 RX ORDER — ATORVASTATIN CALCIUM 20 MG/1
20 TABLET, FILM COATED ORAL DAILY
Qty: 90 TABLET | Refills: 1 | OUTPATIENT
Start: 2021-06-10

## 2021-08-20 RX ORDER — AMLODIPINE BESYLATE 10 MG/1
10 TABLET ORAL DAILY
Qty: 90 TABLET | Refills: 0 | OUTPATIENT
Start: 2021-08-20

## 2021-08-20 NOTE — TELEPHONE ENCOUNTER
Rx Refill Note  Requested Prescriptions     Pending Prescriptions Disp Refills   • amLODIPine (NORVASC) 10 MG tablet 90 tablet 0     Sig: Take 1 tablet by mouth Daily.      Last office visit with prescribing clinician: 6/23/2020      Next office visit with prescribing clinician: Visit date not found            Mya Rojo MA  08/20/21, 16:18 EDT

## 2021-09-27 RX ORDER — POTASSIUM CHLORIDE 750 MG/1
TABLET, FILM COATED, EXTENDED RELEASE ORAL
Qty: 60 TABLET | Refills: 5 | OUTPATIENT
Start: 2021-09-27

## 2024-08-01 NOTE — PROGRESS NOTES
Glucose elevated in the possible diabetic range.  Liver enzymes also elevated.  May be related.  We'll discuss more at upcoming visit.
Render Risk Assessment In Note?: no
Detail Level: Simple
Additional Notes: **all communication done thru interpretor. \\npt to follow up with Citizen of [place] speaking doc.